# Patient Record
Sex: MALE | Race: OTHER | Employment: FULL TIME | URBAN - METROPOLITAN AREA
[De-identification: names, ages, dates, MRNs, and addresses within clinical notes are randomized per-mention and may not be internally consistent; named-entity substitution may affect disease eponyms.]

---

## 2017-10-05 ENCOUNTER — TELEPHONE (OUTPATIENT)
Dept: FAMILY MEDICINE CLINIC | Facility: CLINIC | Age: 47
End: 2017-10-05

## 2017-10-05 RX ORDER — CEFPODOXIME PROXETIL 200 MG/1
200 TABLET, FILM COATED ORAL 2 TIMES DAILY
Qty: 14 TABLET | Refills: 0 | Status: SHIPPED | OUTPATIENT
Start: 2017-10-05 | End: 2018-03-19

## 2018-02-01 RX ORDER — NAPROXEN 500 MG/1
TABLET ORAL
Qty: 60 TABLET | Refills: 1 | Status: SHIPPED | OUTPATIENT
Start: 2018-02-01 | End: 2018-07-24

## 2018-07-24 RX ORDER — NAPROXEN 500 MG/1
TABLET ORAL
Qty: 60 TABLET | Refills: 1 | OUTPATIENT
Start: 2018-07-24

## 2021-06-07 ENCOUNTER — OFFICE VISIT (OUTPATIENT)
Dept: INTERNAL MEDICINE CLINIC | Facility: CLINIC | Age: 51
End: 2021-06-07
Payer: COMMERCIAL

## 2021-06-07 VITALS
BODY MASS INDEX: 38.95 KG/M2 | RESPIRATION RATE: 17 BRPM | OXYGEN SATURATION: 99 % | TEMPERATURE: 98 F | WEIGHT: 257 LBS | HEART RATE: 75 BPM | DIASTOLIC BLOOD PRESSURE: 82 MMHG | HEIGHT: 68 IN | SYSTOLIC BLOOD PRESSURE: 136 MMHG

## 2021-06-07 DIAGNOSIS — R71.8 LOW MEAN CORPUSCULAR VOLUME (MCV): ICD-10-CM

## 2021-06-07 DIAGNOSIS — D50.9 IRON DEFICIENCY ANEMIA, UNSPECIFIED IRON DEFICIENCY ANEMIA TYPE: ICD-10-CM

## 2021-06-07 DIAGNOSIS — Z00.00 ENCOUNTER FOR PREVENTIVE CARE: ICD-10-CM

## 2021-06-07 DIAGNOSIS — R74.01 TRANSAMINITIS: ICD-10-CM

## 2021-06-07 DIAGNOSIS — R74.8 ELEVATED ALKALINE PHOSPHATASE LEVEL: ICD-10-CM

## 2021-06-07 DIAGNOSIS — M25.50 MULTIPLE JOINT PAIN: ICD-10-CM

## 2021-06-07 DIAGNOSIS — Z00.00 ANNUAL PHYSICAL EXAM: Primary | ICD-10-CM

## 2021-06-07 PROCEDURE — 3008F BODY MASS INDEX DOCD: CPT | Performed by: INTERNAL MEDICINE

## 2021-06-07 PROCEDURE — 99386 PREV VISIT NEW AGE 40-64: CPT | Performed by: INTERNAL MEDICINE

## 2021-06-07 PROCEDURE — 3079F DIAST BP 80-89 MM HG: CPT | Performed by: INTERNAL MEDICINE

## 2021-06-07 PROCEDURE — 36415 COLL VENOUS BLD VENIPUNCTURE: CPT | Performed by: INTERNAL MEDICINE

## 2021-06-07 PROCEDURE — 3075F SYST BP GE 130 - 139MM HG: CPT | Performed by: INTERNAL MEDICINE

## 2021-06-07 NOTE — PROGRESS NOTES
HPI/Subjective:     Patient ID: Saurabh Wagoner is a 46year old male.     HPI  Comes in today for first time to establish care for annual physical patient complains today of multijoint pain that has been going on for few months now on and off he plays basketba nursing note reviewed. Constitutional:       Appearance: He is well-developed. HENT:      Head: Normocephalic and atraumatic.       Right Ear: External ear normal.      Left Ear: External ear normal.      Nose: Nose normal.   Eyes:      Conjunctiva/scle

## 2021-06-09 ENCOUNTER — OFFICE VISIT (OUTPATIENT)
Dept: GASTROENTEROLOGY | Facility: CLINIC | Age: 51
End: 2021-06-09
Payer: COMMERCIAL

## 2021-06-09 ENCOUNTER — TELEPHONE (OUTPATIENT)
Dept: GASTROENTEROLOGY | Facility: CLINIC | Age: 51
End: 2021-06-09

## 2021-06-09 VITALS
TEMPERATURE: 99 F | DIASTOLIC BLOOD PRESSURE: 86 MMHG | WEIGHT: 256 LBS | HEART RATE: 83 BPM | HEIGHT: 68 IN | SYSTOLIC BLOOD PRESSURE: 133 MMHG | BODY MASS INDEX: 38.8 KG/M2

## 2021-06-09 DIAGNOSIS — R74.8 ELEVATED ALKALINE PHOSPHATASE LEVEL: ICD-10-CM

## 2021-06-09 DIAGNOSIS — D50.9 IRON DEFICIENCY ANEMIA, UNSPECIFIED IRON DEFICIENCY ANEMIA TYPE: Primary | ICD-10-CM

## 2021-06-09 DIAGNOSIS — R74.01 TRANSAMINITIS: ICD-10-CM

## 2021-06-09 PROCEDURE — 3008F BODY MASS INDEX DOCD: CPT | Performed by: NURSE PRACTITIONER

## 2021-06-09 PROCEDURE — 3075F SYST BP GE 130 - 139MM HG: CPT | Performed by: NURSE PRACTITIONER

## 2021-06-09 PROCEDURE — 99244 OFF/OP CNSLTJ NEW/EST MOD 40: CPT | Performed by: NURSE PRACTITIONER

## 2021-06-09 PROCEDURE — 3079F DIAST BP 80-89 MM HG: CPT | Performed by: NURSE PRACTITIONER

## 2021-06-09 NOTE — H&P
[facility], [facility] - Gastroenterology                                                                                                               Reason for consult: g Problem Relation Age of Onset   • Diabetes Brother    • No Known Problems Mother    • No Known Problems Father       Social History: Social History    Tobacco Use      Smoking status: Never Smoker      Smokeless tobacco: Never Used    Vaping Use      Vap Ref Range    WBC 7.0 4.0 - 11.0 x10(3) uL    RBC 6.02 (H) 4.30 - 5.70 x10(6)uL    HGB 13.9 13.0 - 17.5 g/dL    HCT 45.3 39.0 - 53.0 %    MCV 75.2 (L) 80.0 - 100.0 fL    MCH 23.1 (L) 26.0 - 34.0 pg    MCHC 30.7 (L) 31.0 - 37.0 g/dL    RDW-SD 46.5 (H) 35.1 - will also get celiac testing. #iron def  Not anemic on last cbc, however mcv low and iron panel c/w iron def. He denies overt signs/symptoms gi bleeding. He denies unintentional weight loss. He is w/o gi complaint. No fhx gi malignancy, ibd, celiac. during dictation, discrepancies may still exist.

## 2021-06-09 NOTE — TELEPHONE ENCOUNTER
Scheduled for:  Colonoscopy [id number] [id number]  Provider Name:  Dr. Gabe Costello  Date:  7/15/21  Location:  [address] [place] [address] 1  Sedation:  MAC  Time:  9:00am (pt is aware to arrive at 8:00am)  Prep:  Miralax/Gatorade  Meds/Allergies Reconciled?:  Ashley/FARHAN reviewed.    Matias White

## 2021-06-09 NOTE — PATIENT INSTRUCTIONS
-ultrasound with primary care  -avoid tylenol, alcohol  -low-fat diet    1. Schedule colonoscopy/egd with PAWAN manning/  [Diagnosis: iron def, ]    2.  bowel prep from pharmacy (split miralax/gatorade )    3.  Continue all medications prior to proce

## 2021-06-10 ENCOUNTER — TELEPHONE (OUTPATIENT)
Dept: INTERNAL MEDICINE CLINIC | Facility: CLINIC | Age: 51
End: 2021-06-10

## 2021-06-10 NOTE — TELEPHONE ENCOUNTER
Spoke with pt,  verified. Pt informed of lab result & MD recommendation, pt provided with central sched tel #. Pt stated understanding.               Holger Mondragon MD   2021  3:23 PM CDT       Iron levels also on the lower side follow with GI for

## 2021-06-16 ENCOUNTER — LAB ENCOUNTER (OUTPATIENT)
Dept: LAB | Facility: HOSPITAL | Age: 51
End: 2021-06-16
Attending: INTERNAL MEDICINE
Payer: COMMERCIAL

## 2021-06-16 ENCOUNTER — OFFICE VISIT (OUTPATIENT)
Dept: RHEUMATOLOGY | Facility: CLINIC | Age: 51
End: 2021-06-16
Payer: COMMERCIAL

## 2021-06-16 VITALS
DIASTOLIC BLOOD PRESSURE: 94 MMHG | BODY MASS INDEX: 38.8 KG/M2 | HEART RATE: 85 BPM | HEIGHT: 68 IN | WEIGHT: 256 LBS | SYSTOLIC BLOOD PRESSURE: 140 MMHG

## 2021-06-16 DIAGNOSIS — M06.9 RHEUMATOID ARTHRITIS INVOLVING MULTIPLE SITES, UNSPECIFIED WHETHER RHEUMATOID FACTOR PRESENT (HCC): Primary | ICD-10-CM

## 2021-06-16 DIAGNOSIS — M06.9 RHEUMATOID ARTHRITIS INVOLVING MULTIPLE SITES, UNSPECIFIED WHETHER RHEUMATOID FACTOR PRESENT (HCC): ICD-10-CM

## 2021-06-16 PROCEDURE — 86704 HEP B CORE ANTIBODY TOTAL: CPT | Performed by: INTERNAL MEDICINE

## 2021-06-16 PROCEDURE — 3077F SYST BP >= 140 MM HG: CPT | Performed by: INTERNAL MEDICINE

## 2021-06-16 PROCEDURE — 82955 ASSAY OF G6PD ENZYME: CPT | Performed by: INTERNAL MEDICINE

## 2021-06-16 PROCEDURE — 86803 HEPATITIS C AB TEST: CPT | Performed by: INTERNAL MEDICINE

## 2021-06-16 PROCEDURE — 86480 TB TEST CELL IMMUN MEASURE: CPT

## 2021-06-16 PROCEDURE — 36415 COLL VENOUS BLD VENIPUNCTURE: CPT

## 2021-06-16 PROCEDURE — 86431 RHEUMATOID FACTOR QUANT: CPT | Performed by: INTERNAL MEDICINE

## 2021-06-16 PROCEDURE — 86706 HEP B SURFACE ANTIBODY: CPT | Performed by: INTERNAL MEDICINE

## 2021-06-16 PROCEDURE — 3008F BODY MASS INDEX DOCD: CPT | Performed by: INTERNAL MEDICINE

## 2021-06-16 PROCEDURE — 99244 OFF/OP CNSLTJ NEW/EST MOD 40: CPT | Performed by: INTERNAL MEDICINE

## 2021-06-16 PROCEDURE — 87340 HEPATITIS B SURFACE AG IA: CPT | Performed by: INTERNAL MEDICINE

## 2021-06-16 PROCEDURE — 3080F DIAST BP >= 90 MM HG: CPT | Performed by: INTERNAL MEDICINE

## 2021-06-16 PROCEDURE — 86140 C-REACTIVE PROTEIN: CPT | Performed by: INTERNAL MEDICINE

## 2021-06-16 RX ORDER — METHYLPREDNISOLONE 4 MG/1
TABLET ORAL
Qty: 1 EACH | Refills: 0 | Status: SHIPPED | OUTPATIENT
Start: 2021-06-16

## 2021-06-16 RX ORDER — SULFASALAZINE 500 MG/1
TABLET ORAL
Qty: 120 TABLET | Refills: 1 | Status: SHIPPED | OUTPATIENT
Start: 2021-06-16 | End: 2021-07-28

## 2021-06-16 NOTE — PATIENT INSTRUCTIONS
You were seen today for a new diagnosis of rheumatoid arthritis that causes joint pain and swelling throughout your joints  Plan to start sulfasalazine 500 mg twice a day for 2 weeks then increase to 1000 mg twice a day  For now if your joints worsen with

## 2021-06-16 NOTE — PROGRESS NOTES
Carson Booth is a 46year old male who presents for Patient presents with:  Consult  Joint Pain  .    HPI:   CC: joint pain  Consult: referred by PCP Dr. Taylor Shoulder    This is a 47 yo M with hx of L ACT and meniscus repair (>15 year ago), Fe deficiency anemia (ne Never used    Alcohol use: Yes      Comment: occassionally    Drug use: No          REVIEW OF SYSTEMS:   Review Of Systems:  Constitutional: No fever, no change in weight or appetitie  Derm: No rashes, no oral ulcers, no alopecia, no photosensitivity, no p palpation. NEURO: Cranial nerves II-XII intact grossly. 5/5 strength throughout in both upper and lower extremities, sensation intact.   PSYCH: normal mood    LABS:     Component      Latest Ref Rng & Units 6/7/2021   URIC ACID      3.5 - 7.2 mg/dL 5.3   S

## 2021-07-05 RX ORDER — FERROUS SULFATE TAB EC 324 MG (65 MG FE EQUIVALENT) 324 (65 FE) MG
TABLET DELAYED RESPONSE ORAL
Qty: 30 TABLET | Refills: 0 | Status: SHIPPED | OUTPATIENT
Start: 2021-07-05 | End: 2021-07-29

## 2021-07-12 ENCOUNTER — LAB ENCOUNTER (OUTPATIENT)
Dept: LAB | Facility: HOSPITAL | Age: 51
End: 2021-07-12
Attending: INTERNAL MEDICINE
Payer: COMMERCIAL

## 2021-07-12 ENCOUNTER — TELEPHONE (OUTPATIENT)
Dept: GASTROENTEROLOGY | Facility: CLINIC | Age: 51
End: 2021-07-12

## 2021-07-12 DIAGNOSIS — R74.01 TRANSAMINITIS: ICD-10-CM

## 2021-07-12 DIAGNOSIS — R74.8 ELEVATED ALKALINE PHOSPHATASE LEVEL: ICD-10-CM

## 2021-07-12 DIAGNOSIS — E61.1 IRON DEFICIENCY: ICD-10-CM

## 2021-07-12 DIAGNOSIS — R79.89 ELEVATED LFTS: ICD-10-CM

## 2021-07-12 DIAGNOSIS — D50.9 IRON DEFICIENCY ANEMIA, UNSPECIFIED IRON DEFICIENCY ANEMIA TYPE: ICD-10-CM

## 2021-07-12 DIAGNOSIS — Z01.818 PRE-OP TESTING: ICD-10-CM

## 2021-07-12 LAB
DEPRECATED HBV CORE AB SER IA-ACNC: 215.7 NG/ML
HAV AB SER QL IA: REACTIVE
HAV IGM SER QL: NONREACTIVE
HBV CORE AB SERPL QL IA: NONREACTIVE
HBV SURFACE AB SER QL: NONREACTIVE
HBV SURFACE AB SERPL IA-ACNC: <3.1 MIU/ML
HBV SURFACE AG SERPL QL IA: NONREACTIVE
HCV AB SERPL QL IA: NONREACTIVE
IGA SERPL-MCNC: 583 MG/DL (ref 70–312)
IGM SERPL-MCNC: 324 MG/DL (ref 43–279)
IMMUNOGLOBULIN PNL SER-MCNC: 1610 MG/DL (ref 791–1643)
INR BLD: 1.16 (ref 0.9–1.2)
IRON SATURATION: 7 %
IRON SERPL-MCNC: 33 UG/DL
PROTHROMBIN TIME: 14.6 SECONDS (ref 11.8–14.5)
TOTAL IRON BINDING CAPACITY: 466 UG/DL (ref 240–450)
TRANSFERRIN SERPL-MCNC: 313 MG/DL (ref 200–360)
VIT B12 SERPL-MCNC: 262 PG/ML (ref 193–986)

## 2021-07-12 PROCEDURE — 82728 ASSAY OF FERRITIN: CPT

## 2021-07-12 PROCEDURE — 86708 HEPATITIS A ANTIBODY: CPT

## 2021-07-12 PROCEDURE — 87340 HEPATITIS B SURFACE AG IA: CPT

## 2021-07-12 PROCEDURE — 84466 ASSAY OF TRANSFERRIN: CPT

## 2021-07-12 PROCEDURE — 82607 VITAMIN B-12: CPT

## 2021-07-12 PROCEDURE — 86255 FLUORESCENT ANTIBODY SCREEN: CPT

## 2021-07-12 PROCEDURE — 36415 COLL VENOUS BLD VENIPUNCTURE: CPT

## 2021-07-12 PROCEDURE — 82390 ASSAY OF CERULOPLASMIN: CPT

## 2021-07-12 PROCEDURE — 86704 HEP B CORE ANTIBODY TOTAL: CPT

## 2021-07-12 PROCEDURE — 82784 ASSAY IGA/IGD/IGG/IGM EACH: CPT

## 2021-07-12 PROCEDURE — 83540 ASSAY OF IRON: CPT

## 2021-07-12 PROCEDURE — 86706 HEP B SURFACE ANTIBODY: CPT

## 2021-07-12 PROCEDURE — 85610 PROTHROMBIN TIME: CPT

## 2021-07-12 PROCEDURE — 86803 HEPATITIS C AB TEST: CPT

## 2021-07-12 PROCEDURE — 80500 HEPATITIS A B + C PROFILE: CPT

## 2021-07-12 PROCEDURE — 86709 HEPATITIS A IGM ANTIBODY: CPT

## 2021-07-12 PROCEDURE — 82103 ALPHA-1-ANTITRYPSIN TOTAL: CPT

## 2021-07-12 PROCEDURE — 86256 FLUORESCENT ANTIBODY TITER: CPT

## 2021-07-12 NOTE — TELEPHONE ENCOUNTER
Overdue reminder letter mailed out to patient.     Labs:     ACTIN (SMOOTH MUSCLE) ANTIBODY  ALPHA-1-ANTITRYPSIN, SERUM  CERULOPLASMIN  IMMUNOGLOBULIN A/G/M, QUANT  IMMUNOGLOBULIN A, QNANT  MITOCHONDRIAL (M2) ANTIBODY  PROTHROMBIN TIME (PT)  VITAMIN B12

## 2021-07-13 LAB
A1AT SERPL-MCNC: 221 MG/DL (ref 90–200)
CERULOPLASMIN SERPL-MCNC: 48.4 MG/DL (ref 20–60)
MITOCHONDRIA AB TITR SER: <20 {TITER}
SARS-COV-2 RNA RESP QL NAA+PROBE: NOT DETECTED
SMOOTH MUSCLE AB TITR SER: <20 {TITER}

## 2021-07-15 ENCOUNTER — ANESTHESIA (OUTPATIENT)
Dept: ENDOSCOPY | Age: 51
End: 2021-07-15
Payer: COMMERCIAL

## 2021-07-15 ENCOUNTER — HOSPITAL ENCOUNTER (OUTPATIENT)
Age: 51
Setting detail: HOSPITAL OUTPATIENT SURGERY
Discharge: HOME | End: 2021-07-15
Attending: INTERNAL MEDICINE | Admitting: INTERNAL MEDICINE
Payer: COMMERCIAL

## 2021-07-15 ENCOUNTER — ANESTHESIA EVENT (OUTPATIENT)
Dept: ENDOSCOPY | Age: 51
End: 2021-07-15
Payer: COMMERCIAL

## 2021-07-15 VITALS
BODY MASS INDEX: 37.13 KG/M2 | DIASTOLIC BLOOD PRESSURE: 86 MMHG | SYSTOLIC BLOOD PRESSURE: 128 MMHG | OXYGEN SATURATION: 97 % | WEIGHT: 245 LBS | RESPIRATION RATE: 20 BRPM | TEMPERATURE: 98 F | HEIGHT: 68 IN | HEART RATE: 86 BPM

## 2021-07-15 DIAGNOSIS — D50.9 IRON DEFICIENCY ANEMIA, UNSPECIFIED IRON DEFICIENCY ANEMIA TYPE: ICD-10-CM

## 2021-07-15 DIAGNOSIS — Z01.818 PRE-OP TESTING: Primary | ICD-10-CM

## 2021-07-15 PROCEDURE — 45380 COLONOSCOPY AND BIOPSY: CPT | Performed by: INTERNAL MEDICINE

## 2021-07-15 PROCEDURE — 43239 EGD BIOPSY SINGLE/MULTIPLE: CPT | Performed by: INTERNAL MEDICINE

## 2021-07-15 PROCEDURE — 99070 SPECIAL SUPPLIES PHYS/QHP: CPT | Performed by: INTERNAL MEDICINE

## 2021-07-15 RX ORDER — MIDAZOLAM HYDROCHLORIDE 1 MG/ML
INJECTION INTRAMUSCULAR; INTRAVENOUS AS NEEDED
Status: DISCONTINUED | OUTPATIENT
Start: 2021-07-15 | End: 2021-07-15 | Stop reason: SURG

## 2021-07-15 RX ORDER — SODIUM CHLORIDE, SODIUM LACTATE, POTASSIUM CHLORIDE, CALCIUM CHLORIDE 600; 310; 30; 20 MG/100ML; MG/100ML; MG/100ML; MG/100ML
INJECTION, SOLUTION INTRAVENOUS CONTINUOUS
Status: DISCONTINUED | OUTPATIENT
Start: 2021-07-15 | End: 2021-07-15

## 2021-07-15 RX ADMIN — MIDAZOLAM HYDROCHLORIDE 2 MG: 1 INJECTION INTRAMUSCULAR; INTRAVENOUS at 09:01:00

## 2021-07-15 RX ADMIN — SODIUM CHLORIDE, SODIUM LACTATE, POTASSIUM CHLORIDE, CALCIUM CHLORIDE: 600; 310; 30; 20 INJECTION, SOLUTION INTRAVENOUS at 08:49:00

## 2021-07-15 NOTE — OPERATIVE REPORT
ESOPHAGOGASTRODUODENOSCOPY (EGD) & COLONOSCOPY REPORT    Veto Rosario    JINA 1970 Age 46year old   PCP Honorio Lind MD Endoscopist Jhon Arguello MD     Date of procedure: 07/15/21    Procedure: EGD w/biopsies & Colonoscopy w/cold biopsy polypectomy    P patient who tolerated the procedure well. Complications: None    EGD findings:      1. Esophagus: The squamocolumnar junction was noted at 40 cm and appeared irregular. The GE junction was noted at 40 cm from the incisors. No significant hiatal hernia. gastric biopsies, gastric polyp, ascending polyps and transverse polyp

## 2021-07-15 NOTE — H&P
Pre Procedure History & Physical Examination    Patient Name: Sabiha Morales  MRN: [id number]  CSN: [id number]  YOB: 1970    Diagnosis: low iron     FERROUS SULFATE 324 (65 Fe) MG Oral Tab EC, TAKE 1 TABLET BY MOUTH EVERY DAY, Disp: 30 tablet, Rf SpO2 97%   BMI 37.25 kg/m²       Gen: Patient appears comfortable and in no acute discomfort  HEENT: the sclera appears anicteric, oropharynx clear, mucus membranes appear moist  CV: regular rate and rhythm, the extremities are warm and well perfused   L

## 2021-07-15 NOTE — ANESTHESIA PREPROCEDURE EVALUATION
Anesthesia PreOp Note    HPI:     Jim Delgado is a 46year old male who presents for preoperative consultation requested by: Sadu Chan MD    Date of Surgery: 7/15/2021    Procedure(s):  COLONOSCOPY/ESOPHAGOGASTRODUODENOSCOPY  ESOPHAGOGASTRODUODENOSCOPY Never used    Substance and Sexual Activity      Alcohol use: Not Currently        Comment: occassionally      Drug use: No      Sexual activity: Not on file    Other Topics      Concerns:        Caffeine Concern: Not Asked        Exercise: Not Asked is 1.727 m (5' 8\") and weight is 111.1 kg (245 lb). His blood pressure is 138/95 (abnormal) and his pulse is 99. His respiration is 15 and oxygen saturation is 98%.     07/15/21  0841   BP: (!) 138/95   Pulse: 99   Resp: 15   SpO2: 98%   Weight: 111.1 kg (

## 2021-07-15 NOTE — ANESTHESIA POSTPROCEDURE EVALUATION
Patient: Renny Sánchez    Procedure Summary     Date: 07/15/21 Room / Location: [facility] ENDOSCOPY 01 / [facility] ENDO    Anesthesia Start: 0515 Anesthesia Stop:     Procedures:       COLONOSCOPY/ESOPHAGOGASTRODUODENOSCOPY (N/A )      ESOPHAGOGASTRODUODENOSCOPY (N/

## 2021-07-17 ENCOUNTER — HOSPITAL ENCOUNTER (OUTPATIENT)
Dept: ULTRASOUND IMAGING | Facility: HOSPITAL | Age: 51
Discharge: HOME | End: 2021-07-17
Attending: INTERNAL MEDICINE
Payer: COMMERCIAL

## 2021-07-17 DIAGNOSIS — R79.89 ELEVATED LFTS: ICD-10-CM

## 2021-07-17 PROCEDURE — 76705 ECHO EXAM OF ABDOMEN: CPT | Performed by: INTERNAL MEDICINE

## 2021-07-28 ENCOUNTER — TELEPHONE (OUTPATIENT)
Dept: GASTROENTEROLOGY | Facility: CLINIC | Age: 51
End: 2021-07-28

## 2021-07-28 ENCOUNTER — OFFICE VISIT (OUTPATIENT)
Dept: RHEUMATOLOGY | Facility: CLINIC | Age: 51
End: 2021-07-28
Payer: COMMERCIAL

## 2021-07-28 ENCOUNTER — TELEPHONE (OUTPATIENT)
Dept: OPHTHALMOLOGY | Facility: CLINIC | Age: 51
End: 2021-07-28

## 2021-07-28 ENCOUNTER — TELEPHONE (OUTPATIENT)
Dept: INTERNAL MEDICINE CLINIC | Facility: CLINIC | Age: 51
End: 2021-07-28

## 2021-07-28 VITALS
HEIGHT: 68 IN | WEIGHT: 245 LBS | BODY MASS INDEX: 37.13 KG/M2 | SYSTOLIC BLOOD PRESSURE: 119 MMHG | HEART RATE: 96 BPM | DIASTOLIC BLOOD PRESSURE: 89 MMHG

## 2021-07-28 DIAGNOSIS — H57.12 PAIN OF LEFT EYE: Primary | ICD-10-CM

## 2021-07-28 DIAGNOSIS — M06.9 RHEUMATOID ARTHRITIS INVOLVING MULTIPLE SITES, UNSPECIFIED WHETHER RHEUMATOID FACTOR PRESENT (HCC): ICD-10-CM

## 2021-07-28 DIAGNOSIS — R79.89 ELEVATED LFTS: Primary | ICD-10-CM

## 2021-07-28 PROCEDURE — 3079F DIAST BP 80-89 MM HG: CPT | Performed by: INTERNAL MEDICINE

## 2021-07-28 PROCEDURE — 3008F BODY MASS INDEX DOCD: CPT | Performed by: INTERNAL MEDICINE

## 2021-07-28 PROCEDURE — 99214 OFFICE O/P EST MOD 30 MIN: CPT | Performed by: INTERNAL MEDICINE

## 2021-07-28 PROCEDURE — 3074F SYST BP LT 130 MM HG: CPT | Performed by: INTERNAL MEDICINE

## 2021-07-28 RX ORDER — SULFASALAZINE 500 MG/1
1500 TABLET ORAL 2 TIMES DAILY
Qty: 180 TABLET | Refills: 1 | Status: SHIPPED | OUTPATIENT
Start: 2021-07-28 | End: 2021-09-29

## 2021-07-28 RX ORDER — METHYLPREDNISOLONE 4 MG/1
TABLET ORAL
Qty: 1 EACH | Refills: 0 | Status: SHIPPED | OUTPATIENT
Start: 2021-07-28

## 2021-07-28 NOTE — TELEPHONE ENCOUNTER
Dr. Yun Irwin-    Patient underwent colonoscopy/egd 07/15/2021. Completed us liver placed by pcp 07/17/2021 which showed fatty liver and to follow up with gastro. Patient wants to added to MD's schedule as soon as possible to discuss next steps.      No

## 2021-07-28 NOTE — TELEPHONE ENCOUNTER
If he has iron studies done in July 12, you do not need to repeat it now.   Please inform him thank you

## 2021-07-28 NOTE — TELEPHONE ENCOUNTER
Tad Kinsey did the chronic liver disease workup. There is no urgency to follow up for likely fatty liver disease. Is he having more acute issues? No cause for COLIN found, can discuss capsule. Can follow up with me or lalita next available.  Does lalita

## 2021-07-28 NOTE — TELEPHONE ENCOUNTER
Nursing:  Liver w/u remarkable for elevated IgA-c/w BOWSER  Elevated IgM-M2 unremarkable    XANDER positive with PCP and titer 320  ASMA normal  Alpha 1 elevated    No acute viral hep-  Hep A reactive-IgM nonreactive  Not immune to Hep B and recommend vaccine w

## 2021-07-28 NOTE — PATIENT INSTRUCTIONS
You were seen today for rheumatoid arthritis  Plan to increase sulfasalazine to 3 pills twice a day  I also gave you the Medrol Dosepak in case her joints worsen  If your symptoms do not improve in 6 weeks would then recommend to add Plaquenil which is the

## 2021-07-28 NOTE — TELEPHONE ENCOUNTER
Patient is requesting orders to check his iron levels. Patient is almost out of medication and he needs to know if he should continue taking medication.  Patient will be at [facility] at 11:40 and is requesting orders before then to make sure he can complete

## 2021-07-28 NOTE — PROGRESS NOTES
Raquel Sapp is a 46year old male. HPI:   No chief complaint on file. I had the pleasure of seeing Raquel Sapp on 7/28/2021 for follow up Seropositive RA (+RF and CCP).      Current Medications:  SSZ 1000 mg BID- started June 2021  Blood work:  ESR some pain and swelling in the left wrist  He also developed left eye redness and pain that started yesterday    HISTORY:  Past Medical History:   Diagnosis Date   • PONV (postoperative nausea and vomiting)     nausea      Social Hx Reviewed   Family Hx Rev Quantiferon-TB1 Minus NIL      IU/mL 0.06    Quantiferon-TB2 Minus NIL      IU/mL 0.01    Quantiferon TB Mitogen minus NIL      IU/mL >10.00    QTB. RESULT      Negative Negative    XANDER Titer/Pattern      <80  320 (A)   Reviewed By:        Nithya Conley,

## 2021-07-28 NOTE — TELEPHONE ENCOUNTER
Left message to call back. Will await call back to review results/orders placed per aprn message below. Need to assist with setting up office visit follow up with Kellie Heading next available.

## 2021-07-29 RX ORDER — FERROUS SULFATE TAB EC 324 MG (65 MG FE EQUIVALENT) 324 (65 FE) MG
1 TABLET DELAYED RESPONSE ORAL DAILY
Qty: 30 TABLET | Refills: 0 | Status: SHIPPED | OUTPATIENT
Start: 2021-07-29

## 2021-07-29 NOTE — TELEPHONE ENCOUNTER
Spoke to patient and informed him that he will need to repeat his labs mid August based on results notes from 7/13/21    Quinton Crandall   7/13/2021 12:46 PM CDT Back to Top      Pt informed to take iron bid and will recheck level in one month.      He is out o

## 2021-07-29 NOTE — TELEPHONE ENCOUNTER
Patient complains of intermittent pain and redness OS>OD which comes and goes for the last several months. Appointment scheduled for an office visit on 08/04/21 with Dr. Danis Lanza.

## 2021-07-29 NOTE — TELEPHONE ENCOUNTER
Alok Littlejohn., MD  You 27 minutes ago (12:16 PM)     Please send this to the correct doctor.  Dr. Salma Banegas is covering for Dr. Selvin Franklin was the patient's    Message text

## 2021-07-30 RX ORDER — FERROUS SULFATE 325(65) MG
325 TABLET ORAL 2 TIMES DAILY
Qty: 60 TABLET | Refills: 0 | Status: SHIPPED | OUTPATIENT
Start: 2021-07-30 | End: 2021-08-25

## 2021-07-30 RX ORDER — FERROUS SULFATE TAB EC 324 MG (65 MG FE EQUIVALENT) 324 (65 FE) MG
TABLET DELAYED RESPONSE ORAL
Qty: 30 TABLET | Refills: 0 | OUTPATIENT
Start: 2021-07-30

## 2021-07-30 NOTE — TELEPHONE ENCOUNTER
Patient called and stated that he is completely out of Iron pills. The patient stated that on 7/13 were instructed to take \"2\" pills (increased) after blood work. He is out now which is the end of July.   He can't take blood work until mid August.

## 2021-07-30 NOTE — TELEPHONE ENCOUNTER
Per notes below/  Rx sent in for BID. Result Care Coordination    Result Notes   Evelina Mcginnis   7/13/2021 12:46 PM CDT Back to Top      Pt informed to take iron bid and will recheck level in one month.     Evelina Mcginnis   7/13/2021 10:32 AM CDT       Pt inf

## 2021-07-30 NOTE — TELEPHONE ENCOUNTER
rob the pharmacist was notified of script sent on 7/29. RN advised Rob to check their system, why did we get an electronic request for this medication?

## 2021-08-03 NOTE — TELEPHONE ENCOUNTER
Followed up with Emir Worthy to review aprn message below, pertaining to active orders and recommendations. Patient persistent in being seen in clinic as soon as possible to discuss next steps.      Offered res 24 for tomorrow, 08/04/2021 with Estil Height at 1:3

## 2021-08-04 ENCOUNTER — OFFICE VISIT (OUTPATIENT)
Dept: GASTROENTEROLOGY | Facility: CLINIC | Age: 51
End: 2021-08-04
Payer: COMMERCIAL

## 2021-08-04 ENCOUNTER — TELEPHONE (OUTPATIENT)
Dept: GASTROENTEROLOGY | Facility: CLINIC | Age: 51
End: 2021-08-04

## 2021-08-04 ENCOUNTER — OFFICE VISIT (OUTPATIENT)
Dept: OPHTHALMOLOGY | Facility: CLINIC | Age: 51
End: 2021-08-04
Payer: COMMERCIAL

## 2021-08-04 VITALS
WEIGHT: 235 LBS | BODY MASS INDEX: 35.61 KG/M2 | HEART RATE: 93 BPM | HEIGHT: 68 IN | DIASTOLIC BLOOD PRESSURE: 85 MMHG | SYSTOLIC BLOOD PRESSURE: 124 MMHG

## 2021-08-04 DIAGNOSIS — K76.0 HEPATIC STEATOSIS: ICD-10-CM

## 2021-08-04 DIAGNOSIS — R79.89 ELEVATED LFTS: ICD-10-CM

## 2021-08-04 DIAGNOSIS — D50.9 IRON DEFICIENCY ANEMIA, UNSPECIFIED IRON DEFICIENCY ANEMIA TYPE: Primary | ICD-10-CM

## 2021-08-04 DIAGNOSIS — H57.89 IRRITATION OF LEFT EYE: Primary | ICD-10-CM

## 2021-08-04 DIAGNOSIS — Z86.010 PERSONAL HISTORY OF COLONIC POLYPS: ICD-10-CM

## 2021-08-04 DIAGNOSIS — H57.89 ITCH OF LEFT EYE: ICD-10-CM

## 2021-08-04 PROBLEM — H57.9 ITCH OF LEFT EYE: Status: ACTIVE | Noted: 2021-08-04

## 2021-08-04 PROCEDURE — 3008F BODY MASS INDEX DOCD: CPT | Performed by: NURSE PRACTITIONER

## 2021-08-04 PROCEDURE — 99215 OFFICE O/P EST HI 40 MIN: CPT | Performed by: NURSE PRACTITIONER

## 2021-08-04 PROCEDURE — 3079F DIAST BP 80-89 MM HG: CPT | Performed by: NURSE PRACTITIONER

## 2021-08-04 PROCEDURE — 99242 OFF/OP CONSLTJ NEW/EST SF 20: CPT | Performed by: OPHTHALMOLOGY

## 2021-08-04 PROCEDURE — 3074F SYST BP LT 130 MM HG: CPT | Performed by: NURSE PRACTITIONER

## 2021-08-04 RX ORDER — POLYETHYLENE GLYCOL 3350, SODIUM CHLORIDE, SODIUM BICARBONATE, POTASSIUM CHLORIDE 420; 11.2; 5.72; 1.48 G/4L; G/4L; G/4L; G/4L
POWDER, FOR SOLUTION ORAL
Qty: 2000 ML | Refills: 0 | Status: SHIPPED | OUTPATIENT
Start: 2021-08-04 | End: 2021-11-22 | Stop reason: ALTCHOICE

## 2021-08-04 NOTE — PROGRESS NOTES
Delores Liz is a 46year old male. HPI:     HPI     Consult      Additional comments: Per Dr. Marlene Thomas was recently Dx with Rheumatoid Arthritis in July 2021 and has been following up with Dr. Fly Blandon every 6 weeks.  Pt states that Therapy Pack Take as directed on package. 1 each 0   • methylPREDNISolone 4 MG Oral Tablet Therapy Pack Take as directed on package.  1 each 0       Allergies:    Seasonal                OTHER (SEE COMMENTS)    Comment:Runny Nose, watery eyes    ROS:     RO Patient was also told to take pictures with his cell phone if he is in the active phase. Discussed with patient symptoms of iritis. Told patient to watch for symptoms of pain, redness, light sensitivity and blurry vision.     For itching, patient should

## 2021-08-04 NOTE — ASSESSMENT & PLAN NOTE
Discussed with patient that there is no active problem in the left eye today. Discussed that many times patient with Rheumatoid Arthritis can have episodes of iritis.   Told patient this could be intermittent bouts of iritis, but on further discussion, pa

## 2021-08-04 NOTE — PROGRESS NOTES
[facility] - Gastroenterology                                                                                                               Reason for consult: f/u    Requesting physician or provider: Vishnu Allen mouth, no Raynaud phenomenon    NSAIDS:  stopped naproxen  Tobacco: no  Alcohol: no  Illicit drugs: no  Stopped tylenol  Takes turmeric and lavern for inflammation     No FH GI malignancy, ibd, celiac     No history of adverse reaction to sedation  No IGGY tablets (1,500 mg total) by mouth 2 (two) times daily. 180 tablet 1   • methylPREDNISolone 4 MG Oral Tablet Therapy Pack Take as directed on package. 1 each 0   • methylPREDNISolone 4 MG Oral Tablet Therapy Pack Take as directed on package.  1 each 0 esophagitis, stricture or endoscopic evidence of Hawkins's esophagus. 2. Stomach: The stomach distended normally. Normal rugal folds were seen. The pylorus was patent. The gastric mucosa appeared erythematous so biopsies taken for H pylori.  There was a 4 adenoma.     D. Transverse colon polyp:  · Polypoid fragment of colonic mucosa with no significant pathologic changes.        .  ASSESSMENT/PLAN:   Derian Canada is a 46year old year-old male with history of htn:    #COLIN  S/p cln/egd w/ Dr. Gabe Costello w/o obvious Referrals:  None    ENDOSCOPIC RISK BENEFIT DISCUSSION: I described the procedure in great detail with the patient.  I discussed the risks and benefits, including but not limited to: bleeding, perforation, infection, anesthesia complications, and even death

## 2021-08-04 NOTE — TELEPHONE ENCOUNTER
Scheduled for:  E [id number]  Provider Name:  Dr. Sima Tran  Date:  08/12/2021 ([place]/brian)  Location:  [facility]  Sedation:  No sedation   Time:  7:30am, (pt is aware to arrive at 6:45am)    Prep:  Single does trilyte  Meds/Allergies Reconciled?:  Ashley/FARHAN re

## 2021-08-04 NOTE — PATIENT INSTRUCTIONS
Itch of left eye  Discussed with patient that there is no active problem in the left eye today. Discussed that many times patient with Rheumatoid Arthritis can have episodes of iritis.   Told patient this could be intermittent bouts of iritis, but on furt

## 2021-08-04 NOTE — PATIENT INSTRUCTIONS
-repeat colon in 5 years (7/2026)  -video capsule endoscopy w/ Dr. Andrei Sequeira  1/2 bowel prep  Dx: iron def anemia  -labs  -avoid nsaids, acetaminophen, alcohol  -hep b vaccine with PCP  -er if yellowing of your eyes/jaundice, abd pain, drowsiness, change in le

## 2021-08-04 NOTE — TELEPHONE ENCOUNTER
Entered into Epic. Recall CLN in 5 years per owen Durbin. Last CLN done 07/15/2021 with Dr. Sagrario Rucker. Recall entered into Patient Outreach for 07/15/2026. HM updated.

## 2021-08-06 ENCOUNTER — TELEPHONE (OUTPATIENT)
Dept: GASTROENTEROLOGY | Facility: CLINIC | Age: 51
End: 2021-08-06

## 2021-08-06 NOTE — TELEPHONE ENCOUNTER
Current Outpatient Medications   Medication Sig Dispense Refill   • PEG 3350-KCl-Na Bicarb-NaCl (TRILYTE) 420 g Oral Recon Soln Take prep as directed by gastro office. May substitute with Trilyte/generic equivalent if needed.  2000 mL 0       Not available-

## 2021-08-06 NOTE — TELEPHONE ENCOUNTER
Contacted Todd López to confirm alternative pharmacy close to his location for prep prescription. Preferred pharmacy ([facility]) out of stock. Requested orders be sent to the [facility] in [place].  P: [id number]    Placed verbal order with Pal Simon (Desirae Acton

## 2021-08-07 NOTE — PAT NURSING NOTE
Pt took iron yesterday and is aware to start holding today 08/07/21.  is aware and  said it is okay to proceed with capsule.

## 2021-08-10 ENCOUNTER — LAB ENCOUNTER (OUTPATIENT)
Dept: LAB | Facility: HOSPITAL | Age: 51
End: 2021-08-10
Attending: INTERNAL MEDICINE
Payer: COMMERCIAL

## 2021-08-10 DIAGNOSIS — Z01.818 PRE-OP TESTING: ICD-10-CM

## 2021-08-11 LAB — SARS-COV-2 RNA RESP QL NAA+PROBE: NOT DETECTED

## 2021-08-12 ENCOUNTER — HOSPITAL ENCOUNTER (OUTPATIENT)
Facility: HOSPITAL | Age: 51
Setting detail: HOSPITAL OUTPATIENT SURGERY
Discharge: HOME | End: 2021-08-12
Attending: INTERNAL MEDICINE | Admitting: INTERNAL MEDICINE
Payer: COMMERCIAL

## 2021-08-12 VITALS
BODY MASS INDEX: 34.86 KG/M2 | RESPIRATION RATE: 11 BRPM | WEIGHT: 230 LBS | DIASTOLIC BLOOD PRESSURE: 98 MMHG | TEMPERATURE: 99 F | HEIGHT: 68 IN | SYSTOLIC BLOOD PRESSURE: 129 MMHG | HEART RATE: 94 BPM | OXYGEN SATURATION: 97 %

## 2021-08-12 DIAGNOSIS — D50.9 IRON DEFICIENCY ANEMIA, UNSPECIFIED IRON DEFICIENCY ANEMIA TYPE: ICD-10-CM

## 2021-08-12 DIAGNOSIS — Z01.818 PRE-OP TESTING: Primary | ICD-10-CM

## 2021-08-12 PROCEDURE — 0DJ07ZZ INSPECTION OF UPPER INTESTINAL TRACT, VIA NATURAL OR ARTIFICIAL OPENING: ICD-10-PCS | Performed by: INTERNAL MEDICINE

## 2021-08-12 PROCEDURE — 91110 GI TRC IMG INTRAL ESOPH-ILE: CPT | Performed by: INTERNAL MEDICINE

## 2021-08-25 NOTE — TELEPHONE ENCOUNTER
Please review. Protocol failed or has no protocol.   Requested Prescriptions   Pending Prescriptions Disp Refills    FERROUS SULFATE 325 (65 Fe) MG Oral Tab [Pharmacy Med Name: FERROUS SULFATE 325 MG TABLET] 60 tablet 0     Sig: TAKE 1 TABLET BY MOUTH TWICE

## 2021-08-26 RX ORDER — FERROUS SULFATE 325(65) MG
1 TABLET ORAL 2 TIMES DAILY
Qty: 180 TABLET | Refills: 1 | Status: SHIPPED | OUTPATIENT
Start: 2021-08-26

## 2021-08-27 ENCOUNTER — TELEPHONE (OUTPATIENT)
Dept: GASTROENTEROLOGY | Facility: CLINIC | Age: 51
End: 2021-08-27

## 2021-08-27 NOTE — TELEPHONE ENCOUNTER
Overdue reminder letter mailed out to patient.     Labs:   CBC WITH DIFFERENTIAL WITH PLATELET   COMP METABOLIC PANEL (14)   PROTHROMBIN TIME (PT)

## 2021-09-08 ENCOUNTER — LAB ENCOUNTER (OUTPATIENT)
Dept: LAB | Facility: HOSPITAL | Age: 51
End: 2021-09-08
Attending: NURSE PRACTITIONER
Payer: COMMERCIAL

## 2021-09-08 ENCOUNTER — OFFICE VISIT (OUTPATIENT)
Dept: RHEUMATOLOGY | Facility: CLINIC | Age: 51
End: 2021-09-08
Payer: COMMERCIAL

## 2021-09-08 ENCOUNTER — TELEPHONE (OUTPATIENT)
Dept: GASTROENTEROLOGY | Facility: CLINIC | Age: 51
End: 2021-09-08

## 2021-09-08 VITALS
WEIGHT: 236 LBS | BODY MASS INDEX: 35.77 KG/M2 | HEIGHT: 68 IN | DIASTOLIC BLOOD PRESSURE: 97 MMHG | HEART RATE: 77 BPM | SYSTOLIC BLOOD PRESSURE: 144 MMHG

## 2021-09-08 DIAGNOSIS — D50.9 IRON DEFICIENCY ANEMIA, UNSPECIFIED IRON DEFICIENCY ANEMIA TYPE: ICD-10-CM

## 2021-09-08 DIAGNOSIS — R74.8 ALKALINE PHOSPHATASE ELEVATION: ICD-10-CM

## 2021-09-08 DIAGNOSIS — M06.9 RHEUMATOID ARTHRITIS INVOLVING MULTIPLE SITES, UNSPECIFIED WHETHER RHEUMATOID FACTOR PRESENT (HCC): Primary | ICD-10-CM

## 2021-09-08 DIAGNOSIS — Z51.81 MEDICATION MONITORING ENCOUNTER: ICD-10-CM

## 2021-09-08 DIAGNOSIS — K76.0 HEPATIC STEATOSIS: ICD-10-CM

## 2021-09-08 DIAGNOSIS — R79.89 ELEVATED LFTS: ICD-10-CM

## 2021-09-08 DIAGNOSIS — R76.8 HIGH TOTAL SERUM IGM: ICD-10-CM

## 2021-09-08 DIAGNOSIS — R74.01 TRANSAMINITIS: Primary | ICD-10-CM

## 2021-09-08 LAB
ALBUMIN SERPL-MCNC: 3.6 G/DL (ref 3.4–5)
ALBUMIN/GLOB SERPL: 1 {RATIO} (ref 1–2)
ALP LIVER SERPL-CCNC: 172 U/L
ALT SERPL-CCNC: 127 U/L
ANION GAP SERPL CALC-SCNC: 6 MMOL/L (ref 0–18)
AST SERPL-CCNC: 111 U/L (ref 15–37)
BASOPHILS # BLD AUTO: 0.02 X10(3) UL (ref 0–0.2)
BASOPHILS NFR BLD AUTO: 0.3 %
BILIRUB SERPL-MCNC: 0.5 MG/DL (ref 0.1–2)
BUN BLD-MCNC: 13 MG/DL (ref 7–18)
BUN/CREAT SERPL: 18.6 (ref 10–20)
CALCIUM BLD-MCNC: 8.7 MG/DL (ref 8.5–10.1)
CHLORIDE SERPL-SCNC: 107 MMOL/L (ref 98–112)
CO2 SERPL-SCNC: 26 MMOL/L (ref 21–32)
CREAT BLD-MCNC: 0.7 MG/DL
DEPRECATED RDW RBC AUTO: 49.4 FL (ref 35.1–46.3)
EOSINOPHIL # BLD AUTO: 0.58 X10(3) UL (ref 0–0.7)
EOSINOPHIL NFR BLD AUTO: 9.1 %
ERYTHROCYTE [DISTWIDTH] IN BLOOD BY AUTOMATED COUNT: 19 % (ref 11–15)
GLOBULIN PLAS-MCNC: 3.5 G/DL (ref 2.8–4.4)
GLUCOSE BLD-MCNC: 101 MG/DL (ref 70–99)
HCT VFR BLD AUTO: 47.3 %
HGB BLD-MCNC: 15.1 G/DL
IMM GRANULOCYTES # BLD AUTO: 0.02 X10(3) UL (ref 0–1)
IMM GRANULOCYTES NFR BLD: 0.3 %
INR BLD: 1.08 (ref 0.9–1.2)
LYMPHOCYTES # BLD AUTO: 2.56 X10(3) UL (ref 1–4)
LYMPHOCYTES NFR BLD AUTO: 40 %
M PROTEIN MFR SERPL ELPH: 7.1 G/DL (ref 6.4–8.2)
MCH RBC QN AUTO: 24.7 PG (ref 26–34)
MCHC RBC AUTO-ENTMCNC: 31.9 G/DL (ref 31–37)
MCV RBC AUTO: 77.3 FL
MONOCYTES # BLD AUTO: 0.64 X10(3) UL (ref 0.1–1)
MONOCYTES NFR BLD AUTO: 10 %
NEUTROPHILS # BLD AUTO: 2.58 X10 (3) UL (ref 1.5–7.7)
NEUTROPHILS # BLD AUTO: 2.58 X10(3) UL (ref 1.5–7.7)
NEUTROPHILS NFR BLD AUTO: 40.3 %
OSMOLALITY SERPL CALC.SUM OF ELEC: 288 MOSM/KG (ref 275–295)
PATIENT FASTING Y/N/NP: YES
PLATELET # BLD AUTO: 191 10(3)UL (ref 150–450)
POTASSIUM SERPL-SCNC: 4.1 MMOL/L (ref 3.5–5.1)
PROTHROMBIN TIME: 13.8 SECONDS (ref 11.8–14.5)
RBC # BLD AUTO: 6.12 X10(6)UL
SODIUM SERPL-SCNC: 139 MMOL/L (ref 136–145)
WBC # BLD AUTO: 6.4 X10(3) UL (ref 4–11)

## 2021-09-08 PROCEDURE — 36415 COLL VENOUS BLD VENIPUNCTURE: CPT

## 2021-09-08 PROCEDURE — 85610 PROTHROMBIN TIME: CPT

## 2021-09-08 PROCEDURE — 84466 ASSAY OF TRANSFERRIN: CPT

## 2021-09-08 PROCEDURE — 80053 COMPREHEN METABOLIC PANEL: CPT

## 2021-09-08 PROCEDURE — 3008F BODY MASS INDEX DOCD: CPT | Performed by: INTERNAL MEDICINE

## 2021-09-08 PROCEDURE — 82728 ASSAY OF FERRITIN: CPT

## 2021-09-08 PROCEDURE — 99214 OFFICE O/P EST MOD 30 MIN: CPT | Performed by: INTERNAL MEDICINE

## 2021-09-08 PROCEDURE — 83540 ASSAY OF IRON: CPT

## 2021-09-08 PROCEDURE — 85025 COMPLETE CBC W/AUTO DIFF WBC: CPT

## 2021-09-08 PROCEDURE — 3080F DIAST BP >= 90 MM HG: CPT | Performed by: INTERNAL MEDICINE

## 2021-09-08 PROCEDURE — 3077F SYST BP >= 140 MM HG: CPT | Performed by: INTERNAL MEDICINE

## 2021-09-08 NOTE — PATIENT INSTRUCTIONS
You were seen today for rheumatoid arthritis, symptoms are better controlled  Plan to continue sulfasalazine at 1000 mg twice a day  Follow-up in 2 months, hopefully joints will continue to to do well

## 2021-09-08 NOTE — PROGRESS NOTES
Wyatt Salazar is a 46year old male. HPI:   Patient presents with:  Medication Follow-Up  Test Results      I had the pleasure of seeing Wyatt Salazar on 9/8/2021 for follow up Seropositive RA (+RF and CCP).      Current Medications:  SSZ 1000 mg BID- starte specialist at the [address] [place] [address] and they noted he had osteoarthritis and was recommending gel injections  Tolerating sulfasalazine, never went up to 1500 mg twice a day          HISTORY:  Past Medical History:   Diagnosis Date   • PONV (postoperative terry present. No pain with ROM. Ankles: FROM, no pain or swelling or warmth on palpation  Feet: no pain with MTP squeeze, no toe swelling or pain or warmth on palpation with FROM  Spine: no lumbar or sacral pain on palpation.   NEURO: Cranial nerves II-XII int evidence of uveitis or iritis    Transaminitis secondary to hepatic steatosis  - Following with GI    Pt will f/u in 2 mos    Sydni Dunn MD  9/8/2021   10:00 AM

## 2021-09-08 NOTE — TELEPHONE ENCOUNTER
Jazmine Krause. Verified . Reviewed aprn message below. Provided central scheduling number to set up mrcp as to be arranged at his earliest convenience. Relayed Dr. Rodriguez Court number to facilitate consult with hepatology per recommendations.  A

## 2021-09-08 NOTE — TELEPHONE ENCOUNTER
APN contacted pt regarding lab testing. AST/ALT/ALK phos up from comparison. PLT normal, however down from comparison.   PT/INR normal.    Liver ultrasound ordered by PCP 7/2021 c/w fatty liver    Liver w/u remarkable for   elevated IgA-c/w BOWSER  Elevated

## 2021-09-08 NOTE — TELEPHONE ENCOUNTER
Nursing:  Can you please let the patient know that his labs show worsening of his lfts. Labs w/u remarkable suggest BOWSER, however IgM elevated-no ductal dilatation on ultrasound.     Sulfasalazine can also sometimes cause liver function abnormality, but

## 2021-09-09 ENCOUNTER — TELEPHONE (OUTPATIENT)
Dept: INTERNAL MEDICINE CLINIC | Facility: CLINIC | Age: 51
End: 2021-09-09

## 2021-09-09 DIAGNOSIS — D50.9 IRON DEFICIENCY ANEMIA, UNSPECIFIED IRON DEFICIENCY ANEMIA TYPE: Primary | ICD-10-CM

## 2021-09-10 LAB
DEPRECATED HBV CORE AB SER IA-ACNC: 144 NG/ML
IRON SATURATION: 15 %
IRON SERPL-MCNC: 81 UG/DL
TOTAL IRON BINDING CAPACITY: 524 UG/DL (ref 240–450)
TRANSFERRIN SERPL-MCNC: 352 MG/DL (ref 200–360)
TRANSFERRIN SERPL-MCNC: 352 MG/DL (ref 200–360)

## 2021-09-10 NOTE — TELEPHONE ENCOUNTER
DR Rose=just for clarification, do you want  patient to do  repeat iron level blood test eventhough it was just done on 9/8/21 ? You also have different note and recommendation under  lab  9/8/21. The only order I've seen was for the fecal occult blood.  Peggy Casiano

## 2021-09-13 NOTE — TELEPHONE ENCOUNTER
Called patient and reviewed PCP's result note and recommendations  Patient verbalized understanding and agreed to plan of care  He requested voicemail be left on his phone with referral information    Voicemail left with GI's information   Narayan Serrano

## 2021-09-22 ENCOUNTER — HOSPITAL ENCOUNTER (OUTPATIENT)
Dept: MRI IMAGING | Age: 51
Discharge: HOME | End: 2021-09-22
Attending: NURSE PRACTITIONER
Payer: COMMERCIAL

## 2021-09-22 ENCOUNTER — TELEPHONE (OUTPATIENT)
Dept: GASTROENTEROLOGY | Facility: CLINIC | Age: 51
End: 2021-09-22

## 2021-09-22 DIAGNOSIS — R74.01 TRANSAMINITIS: ICD-10-CM

## 2021-09-22 DIAGNOSIS — R76.8 HIGH TOTAL SERUM IGM: ICD-10-CM

## 2021-09-22 DIAGNOSIS — R74.8 ALKALINE PHOSPHATASE ELEVATION: ICD-10-CM

## 2021-09-22 DIAGNOSIS — K76.0 HEPATIC STEATOSIS: ICD-10-CM

## 2021-09-22 PROCEDURE — A9575 INJ GADOTERATE MEGLUMI 0.1ML: HCPCS | Performed by: NURSE PRACTITIONER

## 2021-09-22 PROCEDURE — 74183 MRI ABD W/O CNTR FLWD CNTR: CPT | Performed by: NURSE PRACTITIONER

## 2021-09-22 NOTE — TELEPHONE ENCOUNTER
Pt states his MRI results are in his My chart but he does not understand, needs clarification please calll

## 2021-09-22 NOTE — TELEPHONE ENCOUNTER
Long Allen,     Patient called in to review mri/mrcp results finalized today (09/22/2021). Attached impression below for review & recommendations if indicated. Thank you! Impression   CONCLUSION:   1.  Moderate hepatic steatosis again noted.  No s

## 2021-09-28 NOTE — TELEPHONE ENCOUNTER
rhiannonn attempted to contact pt. Lmtcb. Recommend:  Low-fat diet, weight loss  Consult w/ Dr. Luis A Duarte as previously recommended  Continue to monitor lfts  Consider repeat imaging in 6 mos for surveillance of borderline enlarged lymph nodes.     Nursing:  [place]

## 2021-09-28 NOTE — TELEPHONE ENCOUNTER
Entered into Epic. Recall mri/mrcp for 6 months (surveillance of borderline enlarged lymph nodes) per owen Fernandes. Last imaging done 09/22/2021. Recall entered into Patient Outreach for 03/22/2022.     Will await call back and/or follow up with O

## 2021-09-29 DIAGNOSIS — M06.9 RHEUMATOID ARTHRITIS INVOLVING MULTIPLE SITES, UNSPECIFIED WHETHER RHEUMATOID FACTOR PRESENT (HCC): ICD-10-CM

## 2021-09-29 RX ORDER — SULFASALAZINE 500 MG/1
1500 TABLET ORAL 2 TIMES DAILY
Qty: 180 TABLET | Refills: 1 | Status: SHIPPED | OUTPATIENT
Start: 2021-09-29

## 2021-09-29 NOTE — TELEPHONE ENCOUNTER
LOV: 9/8/21  Future Appointments   Date Time Provider Sean Rubalcava   11/10/2021 10:00 AM Ike Cummings MD [address] [facility]    LABS:    You were seen today for rheumatoid arthritis, symptoms are better controlled  Plan to continue sulfasalazine at 100

## 2021-09-29 NOTE — TELEPHONE ENCOUNTER
Current Outpatient Medications   Medication Sig Dispense Refill   • sulfaSALAzine 500 MG Oral Tab Take 3 tablets (1,500 mg total) by mouth 2 (two) times daily.  180 tablet 1

## 2021-10-01 NOTE — OPERATIVE REPORT
VIDEO CAPSULE ENDOSCOPY REPORT    Noni MURO 1970 Age 46year old   PCP Eagle Fair MD Gastroenterologist Nelia Castleman, MD       Date of procedure:     Pre-operative diagnosis: anemia    Post-operative diagnosis: normal capsule, no bleeding

## 2021-10-05 NOTE — TELEPHONE ENCOUNTER
Attempted to reach Geovanni Washburn to review aprn recommendations below. Left message to call back. 3rd attempt with no return call. Scheduled for follow up with owen Rae 10/13/2021.

## 2021-10-05 NOTE — TELEPHONE ENCOUNTER
Left detailed message with below recommendations. OK per cecil to leave detailed message on cell number.

## 2021-10-13 ENCOUNTER — OFFICE VISIT (OUTPATIENT)
Dept: GASTROENTEROLOGY | Facility: CLINIC | Age: 51
End: 2021-10-13
Payer: COMMERCIAL

## 2021-10-13 VITALS
SYSTOLIC BLOOD PRESSURE: 142 MMHG | HEART RATE: 75 BPM | DIASTOLIC BLOOD PRESSURE: 89 MMHG | HEIGHT: 68 IN | BODY MASS INDEX: 36.9 KG/M2 | TEMPERATURE: 99 F | WEIGHT: 243.5 LBS

## 2021-10-13 DIAGNOSIS — Z86.010 PERSONAL HISTORY OF COLONIC POLYPS: Primary | ICD-10-CM

## 2021-10-13 DIAGNOSIS — I10 PRIMARY HYPERTENSION: ICD-10-CM

## 2021-10-13 DIAGNOSIS — R79.89 ELEVATED LFTS: ICD-10-CM

## 2021-10-13 DIAGNOSIS — D50.9 IRON DEFICIENCY ANEMIA, UNSPECIFIED IRON DEFICIENCY ANEMIA TYPE: ICD-10-CM

## 2021-10-13 PROCEDURE — 3079F DIAST BP 80-89 MM HG: CPT | Performed by: NURSE PRACTITIONER

## 2021-10-13 PROCEDURE — 3077F SYST BP >= 140 MM HG: CPT | Performed by: NURSE PRACTITIONER

## 2021-10-13 PROCEDURE — 99215 OFFICE O/P EST HI 40 MIN: CPT | Performed by: NURSE PRACTITIONER

## 2021-10-13 PROCEDURE — 3008F BODY MASS INDEX DOCD: CPT | Performed by: NURSE PRACTITIONER

## 2021-10-13 NOTE — PROGRESS NOTES
[address] [facility] - Gastroenterology                                                                                                            Reason for consult: f/u kg)  08/12/21 : 230 lb (104.3 kg)  08/04/21 : 235 lb (106.6 kg)  07/28/21 : 245 lb (111.1 kg)  07/15/21 : 245 lb (111.1 kg)       History, Medications, Allergies, ROS:      Past Medical History:   Diagnosis Date   • PONV (postoperative nausea and vomiting) eyes    ROS:   CONSTITUTIONAL: negative for fevers, chills, sweats and weight loss  EYES Negative for red eyes, yellow eyes, changes in vision  HEENT: Negative for dysphagia and hoarseness  RESPIRATORY: Negative for cough and shortness of breath  CARDIOVAS 0.70 - 1.30 mg/dL    BUN/CREA Ratio 18.6 10.0 - 20.0    Calcium, Total 8.7 8.5 - 10.1 mg/dL    Calculated Osmolality 288 275 - 295 mOsm/kg    GFR, Non- 109 >=60    GFR, -American 126 >=60     (H) 16 - 61 U/L     (H) 15 condition decline.    -Make appt to see Dr. Renard Carrasquillo (liver MD)  -low-fat diet  -weight loss  -rheumatology/f/u as planned  -imaging 3/2022  -avoid alcohol, nsaids, acetaminophen  -er if jaundice, change in level of consciousness  -continue to monitor hgb-rep

## 2021-10-13 NOTE — PATIENT INSTRUCTIONS
-Make appt to see Dr. Charity Francis (liver MD) [phone]  -low-fat diet  -weight loss  -rheumatology/f/u as planned  -imaging 3/2022  -avoid alcohol, nsaids, acetaminophen  -er if jaundice, change in level of consciousness  -continue to monitor hgb-repeat labs

## 2021-10-23 ENCOUNTER — TELEPHONE (OUTPATIENT)
Dept: GASTROENTEROLOGY | Facility: CLINIC | Age: 51
End: 2021-10-23

## 2021-10-23 NOTE — TELEPHONE ENCOUNTER
Mr Rachid Cope had me paged as MD on call today Saturday to discuss his fatty liver disease diagnosis and recent Nando 89 10/13/2021. He calls to report new symptoms abdominal pain, bloating constipation since Wednesday this week. Feeling bloated.

## 2021-10-25 NOTE — TELEPHONE ENCOUNTER
apn contact pt to check status. He says that he did not try laxative. Is moving bowels c/w baseline. He denies abd pain. No fever/chills. No brbpr and/or melena. Recommend ctm. F/u if recurrence of symptoms.

## 2021-11-10 ENCOUNTER — OFFICE VISIT (OUTPATIENT)
Dept: RHEUMATOLOGY | Facility: CLINIC | Age: 51
End: 2021-11-10
Payer: COMMERCIAL

## 2021-11-10 VITALS
DIASTOLIC BLOOD PRESSURE: 86 MMHG | HEART RATE: 76 BPM | BODY MASS INDEX: 36.83 KG/M2 | HEIGHT: 68 IN | SYSTOLIC BLOOD PRESSURE: 158 MMHG | WEIGHT: 243 LBS

## 2021-11-10 DIAGNOSIS — M06.9 RHEUMATOID ARTHRITIS INVOLVING MULTIPLE SITES, UNSPECIFIED WHETHER RHEUMATOID FACTOR PRESENT (HCC): Primary | ICD-10-CM

## 2021-11-10 DIAGNOSIS — Z51.81 MEDICATION MONITORING ENCOUNTER: ICD-10-CM

## 2021-11-10 PROCEDURE — 3079F DIAST BP 80-89 MM HG: CPT | Performed by: INTERNAL MEDICINE

## 2021-11-10 PROCEDURE — 3077F SYST BP >= 140 MM HG: CPT | Performed by: INTERNAL MEDICINE

## 2021-11-10 PROCEDURE — 99214 OFFICE O/P EST MOD 30 MIN: CPT | Performed by: INTERNAL MEDICINE

## 2021-11-10 PROCEDURE — 3008F BODY MASS INDEX DOCD: CPT | Performed by: INTERNAL MEDICINE

## 2021-11-10 NOTE — PROGRESS NOTES
Rachid Cortez is a 46year old male. HPI:   Patient presents with:  Medication Follow-Up      I had the pleasure of seeing Rachid Cortez on 11/10/2021 for follow up Seropositive RA (+RF and CCP).      Current Medications:  SSZ 1000 mg AM and 500 mg PM- start specialist at the [address] [place] [address] and they noted he had osteoarthritis and was recommending gel injections  Tolerating sulfasalazine, never went up to 1500 mg twice a day    Today:  Patient presents for follow-up of seropositive RA (+ RF and CCP).  Also has .cmed  Allergies:    Seasonal                OTHER (SEE COMMENTS)    Comment:Runny Nose, watery eyes      ROS:   All other ROS are negative. PHYSICAL EXAM:   GEN: AAOx3, NAD  HEENT: EOMI, PERRLA, L eye redness  CVS: RRR, no murmurs rubs or gallops. >600 (H)    HEPATITIS B CORE AB, TOTAL      Nonreactive  Nonreactive    GLUCOSE-6-PHOSPHATE DEHYDROGEN      9.9 - 16.6 U/g Hb 16.1    HCV AB      Nonreactive  Nonreactive      Imaging:     US liver:  CONCLUSION:   1.  Findings suggestive of diffuse hepatic

## 2021-11-10 NOTE — PATIENT INSTRUCTIONS
You were seen today for rheumatoid arthritis  Symptoms are better controlled  Continue sulfasalazine 1000 in the morning and 500 at night  Blood work in December and March  Follow-up in February or March

## 2021-11-22 ENCOUNTER — OFFICE VISIT (OUTPATIENT)
Dept: SURGERY | Facility: CLINIC | Age: 51
End: 2021-11-22
Payer: COMMERCIAL

## 2021-11-22 VITALS
RESPIRATION RATE: 16 BRPM | OXYGEN SATURATION: 97 % | BODY MASS INDEX: 38 KG/M2 | TEMPERATURE: 96 F | WEIGHT: 252 LBS | HEART RATE: 74 BPM | SYSTOLIC BLOOD PRESSURE: 160 MMHG | DIASTOLIC BLOOD PRESSURE: 103 MMHG

## 2021-11-22 DIAGNOSIS — R79.89 ELEVATED LIVER FUNCTION TESTS: Primary | ICD-10-CM

## 2021-11-22 DIAGNOSIS — K76.0 NAFLD (NONALCOHOLIC FATTY LIVER DISEASE): ICD-10-CM

## 2021-11-22 NOTE — PROGRESS NOTES
[facility] at [facility]  [address] [facility], [address]  [address]  [phone] ([phone]) 11/22/2021), Disp: 180 tablet, Rfl: 1  •  Ferrous Sulfate 324 (65 Fe) MG Oral Tab EC, Take 1 tablet by mouth daily.  (Patient not taking: Reported on 11/22/2021), Disp: 30 tablet, Rfl: 0  •  methylPREDNISolone 4 MG Oral Tablet Therapy Pack, Take as directed

## 2022-01-27 ENCOUNTER — TELEPHONE (OUTPATIENT)
Dept: RHEUMATOLOGY | Facility: CLINIC | Age: 52
End: 2022-01-27

## 2022-01-27 RX ORDER — PREDNISONE 10 MG/1
TABLET ORAL
Qty: 30 TABLET | Refills: 0 | Status: SHIPPED | OUTPATIENT
Start: 2022-01-27

## 2022-01-27 NOTE — TELEPHONE ENCOUNTER
Pt states that his arthritis is really bad. Pt states that the medication is not working. Pt states that the flare up is really bad.

## 2022-01-27 NOTE — TELEPHONE ENCOUNTER
Called patient back. He developed pain and swelling in his hands, shoulders, back. Took a Medrol Dosepak but did not help. He also increase his sulfasalazine to 3 pills twice a day. I will prescribe a higher dose of prednisone.   Advised him that he may

## 2022-01-27 NOTE — TELEPHONE ENCOUNTER
Patient states having flare up for a week and he is having pain all over his body rating at 8/10. He states he did take prednisone pack that ended on Sunday. It did not help. He is taking sulfasalazine as prescribed.  He would like a call back from provider

## 2022-02-01 ENCOUNTER — LAB ENCOUNTER (OUTPATIENT)
Dept: LAB | Facility: HOSPITAL | Age: 52
End: 2022-02-01
Attending: INTERNAL MEDICINE
Payer: COMMERCIAL

## 2022-02-01 ENCOUNTER — OFFICE VISIT (OUTPATIENT)
Dept: RHEUMATOLOGY | Facility: CLINIC | Age: 52
End: 2022-02-01
Payer: COMMERCIAL

## 2022-02-01 VITALS
DIASTOLIC BLOOD PRESSURE: 95 MMHG | HEIGHT: 68 IN | HEART RATE: 109 BPM | RESPIRATION RATE: 16 BRPM | SYSTOLIC BLOOD PRESSURE: 144 MMHG | BODY MASS INDEX: 36.37 KG/M2 | WEIGHT: 240 LBS

## 2022-02-01 DIAGNOSIS — M25.572 ACUTE LEFT ANKLE PAIN: ICD-10-CM

## 2022-02-01 DIAGNOSIS — M06.9 RHEUMATOID ARTHRITIS INVOLVING MULTIPLE SITES, UNSPECIFIED WHETHER RHEUMATOID FACTOR PRESENT (HCC): ICD-10-CM

## 2022-02-01 DIAGNOSIS — R79.89 ELEVATED LIVER FUNCTION TESTS: ICD-10-CM

## 2022-02-01 DIAGNOSIS — M06.9 RHEUMATOID ARTHRITIS INVOLVING MULTIPLE SITES, UNSPECIFIED WHETHER RHEUMATOID FACTOR PRESENT (HCC): Primary | ICD-10-CM

## 2022-02-01 DIAGNOSIS — Z51.81 MEDICATION MONITORING ENCOUNTER: ICD-10-CM

## 2022-02-01 LAB
ALBUMIN SERPL-MCNC: 3.1 G/DL (ref 3.4–5)
ALBUMIN SERPL-MCNC: 3.1 G/DL (ref 3.4–5)
ALP LIVER SERPL-CCNC: 129 U/L
ALT SERPL-CCNC: 32 U/L
ALT SERPL-CCNC: 32 U/L
AST SERPL-CCNC: 19 U/L (ref 15–37)
AST SERPL-CCNC: 19 U/L (ref 15–37)
BASOPHILS # BLD AUTO: 0.03 X10(3) UL (ref 0–0.2)
BASOPHILS NFR BLD AUTO: 0.3 %
BILIRUB DIRECT SERPL-MCNC: 0.2 MG/DL (ref 0–0.2)
BILIRUB SERPL-MCNC: 0.6 MG/DL (ref 0.1–2)
CK SERPL-CCNC: 35 U/L
CREAT BLD-MCNC: 0.76 MG/DL
DEPRECATED RDW RBC AUTO: 39.8 FL (ref 35.1–46.3)
EOSINOPHIL # BLD AUTO: 0 X10(3) UL (ref 0–0.7)
EOSINOPHIL NFR BLD AUTO: 0 %
ERYTHROCYTE [DISTWIDTH] IN BLOOD BY AUTOMATED COUNT: 13.6 % (ref 11–15)
ERYTHROCYTE [SEDIMENTATION RATE] IN BLOOD: 60 MM/HR
GGT SERPL-CCNC: 71 U/L
HCT VFR BLD AUTO: 46 %
HGB BLD-MCNC: 15 G/DL
IMM GRANULOCYTES # BLD AUTO: 0.07 X10(3) UL (ref 0–1)
IMM GRANULOCYTES NFR BLD: 0.6 %
LYMPHOCYTES # BLD AUTO: 2.08 X10(3) UL (ref 1–4)
LYMPHOCYTES NFR BLD AUTO: 17.4 %
MCH RBC QN AUTO: 26.1 PG (ref 26–34)
MCHC RBC AUTO-ENTMCNC: 32.6 G/DL (ref 31–37)
MONOCYTES # BLD AUTO: 0.93 X10(3) UL (ref 0.1–1)
MONOCYTES NFR BLD AUTO: 7.8 %
NEUTROPHILS # BLD AUTO: 8.85 X10 (3) UL (ref 1.5–7.7)
NEUTROPHILS # BLD AUTO: 8.85 X10(3) UL (ref 1.5–7.7)
PLATELET # BLD AUTO: 331 10(3)UL (ref 150–450)
PROT SERPL-MCNC: 7.6 G/DL (ref 6.4–8.2)
RBC # BLD AUTO: 5.74 X10(6)UL
WBC # BLD AUTO: 12 X10(3) UL (ref 4–11)

## 2022-02-01 PROCEDURE — 84450 TRANSFERASE (AST) (SGOT): CPT

## 2022-02-01 PROCEDURE — 82977 ASSAY OF GGT: CPT

## 2022-02-01 PROCEDURE — 80076 HEPATIC FUNCTION PANEL: CPT

## 2022-02-01 PROCEDURE — 84080 ASSAY ALKALINE PHOSPHATASES: CPT

## 2022-02-01 PROCEDURE — 20605 DRAIN/INJ JOINT/BURSA W/O US: CPT | Performed by: INTERNAL MEDICINE

## 2022-02-01 PROCEDURE — 3080F DIAST BP >= 90 MM HG: CPT | Performed by: INTERNAL MEDICINE

## 2022-02-01 PROCEDURE — 84460 ALANINE AMINO (ALT) (SGPT): CPT

## 2022-02-01 PROCEDURE — 3077F SYST BP >= 140 MM HG: CPT | Performed by: INTERNAL MEDICINE

## 2022-02-01 PROCEDURE — 85025 COMPLETE CBC W/AUTO DIFF WBC: CPT

## 2022-02-01 PROCEDURE — 36415 COLL VENOUS BLD VENIPUNCTURE: CPT

## 2022-02-01 PROCEDURE — 82040 ASSAY OF SERUM ALBUMIN: CPT

## 2022-02-01 PROCEDURE — 84075 ASSAY ALKALINE PHOSPHATASE: CPT

## 2022-02-01 PROCEDURE — 3008F BODY MASS INDEX DOCD: CPT | Performed by: INTERNAL MEDICINE

## 2022-02-01 PROCEDURE — 82550 ASSAY OF CK (CPK): CPT

## 2022-02-01 PROCEDURE — 82565 ASSAY OF CREATININE: CPT

## 2022-02-01 PROCEDURE — 99214 OFFICE O/P EST MOD 30 MIN: CPT | Performed by: INTERNAL MEDICINE

## 2022-02-01 PROCEDURE — 86140 C-REACTIVE PROTEIN: CPT

## 2022-02-01 PROCEDURE — 85652 RBC SED RATE AUTOMATED: CPT

## 2022-02-01 RX ORDER — TRIAMCINOLONE ACETONIDE 40 MG/ML
20 INJECTION, SUSPENSION INTRA-ARTICULAR; INTRAMUSCULAR ONCE
Status: COMPLETED | OUTPATIENT
Start: 2022-02-01 | End: 2022-02-01

## 2022-02-01 RX ORDER — PREDNISONE 10 MG/1
TABLET ORAL
Qty: 21 TABLET | Refills: 0 | Status: SHIPPED | OUTPATIENT
Start: 2022-02-01

## 2022-02-01 RX ADMIN — TRIAMCINOLONE ACETONIDE 20 MG: 40 INJECTION, SUSPENSION INTRA-ARTICULAR; INTRAMUSCULAR at 17:18:00

## 2022-02-01 NOTE — TELEPHONE ENCOUNTER
Per patient, he is taking the recommended dosage that doctor gave but per patient his left foot is still swollen and he is still having flare up all over his body and did not go to work, patient would like to know what else to do.

## 2022-02-01 NOTE — TELEPHONE ENCOUNTER
Patient scheduled.      Future Appointments   Date Time Provider Sean Rubalcava   2/1/2022  4:50 PM Irene Jane MD [address] [facility]  [facility]   2/15/2022  2:30 PM ELIJAH [facility]   2/28/2022  3:45 PM Sheyla Mcleod MD

## 2022-02-01 NOTE — PATIENT INSTRUCTIONS
1. Rest left ankle x 3 dasy   2. Take sulfasalazine 1500mg in am and 1500mg in pm   3. Ok to try to take pred 20mg in am and if still having pain by evening - take 40mg a day - additiona   Then take pred 10m -  take 5 tabsx 1 day, take 4 tabsx 1 day,take 3 tabsx 1 day, take 2 tabsx 1 day, take 1 tabsx 1 day, then off  4.  Follow up with dr. Rebeka Garcia

## 2022-02-01 NOTE — TELEPHONE ENCOUNTER
Patient expressed that he feels that the prednisone and the sulfasalazine are not helping. States he was going to stop the prednisone because it has not worked. Advised him to continue has prescribed so he can continue to taper his dose.  He has missed 4 da

## 2022-02-01 NOTE — PROCEDURES
With  consent, I injected the patient's left ankle with 0.5ml lidocaine  1% and 0.5ml kenalog 40. It was under sterile technique using iodine and alcohol swabs and ethyl chloride was used as an anaesthetic spray. Pt.  tolerated it well.

## 2022-02-04 LAB
ALK-PHOSPHATASE BONE CALC: 60 U/L
ALK-PHOSPHATASE LIVER CALC: 73 U/L
ALK-PHOSPHATASE OTHER CALC: 0 U/L
ALKALINE PHOSPHATASE: 133 U/L

## 2022-02-09 ENCOUNTER — OFFICE VISIT (OUTPATIENT)
Dept: RHEUMATOLOGY | Facility: CLINIC | Age: 52
End: 2022-02-09
Payer: COMMERCIAL

## 2022-02-09 VITALS — HEIGHT: 68 IN | BODY MASS INDEX: 36.37 KG/M2 | WEIGHT: 240 LBS

## 2022-02-09 DIAGNOSIS — M06.9 RHEUMATOID ARTHRITIS INVOLVING MULTIPLE SITES, UNSPECIFIED WHETHER RHEUMATOID FACTOR PRESENT (HCC): ICD-10-CM

## 2022-02-09 DIAGNOSIS — Z51.81 MEDICATION MONITORING ENCOUNTER: Primary | ICD-10-CM

## 2022-02-09 PROCEDURE — 3008F BODY MASS INDEX DOCD: CPT | Performed by: INTERNAL MEDICINE

## 2022-02-09 PROCEDURE — 99214 OFFICE O/P EST MOD 30 MIN: CPT | Performed by: INTERNAL MEDICINE

## 2022-02-09 RX ORDER — HYDROXYCHLOROQUINE SULFATE 200 MG/1
400 TABLET, FILM COATED ORAL DAILY
Qty: 180 TABLET | Refills: 0 | Status: SHIPPED | OUTPATIENT
Start: 2022-02-09

## 2022-02-09 RX ORDER — SULFASALAZINE 500 MG/1
1500 TABLET ORAL 2 TIMES DAILY
Qty: 180 TABLET | Refills: 1 | Status: SHIPPED | OUTPATIENT
Start: 2022-02-09 | End: 2022-03-09

## 2022-02-09 NOTE — PATIENT INSTRUCTIONS
You were seen today for rheumatoid arthritis  Continue sulfasalazine 1500 mg twice a day  We will be adding Plaquenil 400 mg daily  We will have to get your eyes checked within 3 to 6 months of taking Plaquenil  We may have to consider Humira if you continue to have joint pain and swelling  Follow-up in the next 2 months

## 2022-02-15 ENCOUNTER — OFFICE VISIT (OUTPATIENT)
Dept: GASTROENTEROLOGY | Facility: CLINIC | Age: 52
End: 2022-02-15
Payer: COMMERCIAL

## 2022-02-15 VITALS
DIASTOLIC BLOOD PRESSURE: 89 MMHG | WEIGHT: 241 LBS | HEART RATE: 88 BPM | BODY MASS INDEX: 36.53 KG/M2 | HEIGHT: 68 IN | SYSTOLIC BLOOD PRESSURE: 134 MMHG

## 2022-02-15 DIAGNOSIS — K76.0 NAFLD (NONALCOHOLIC FATTY LIVER DISEASE): ICD-10-CM

## 2022-02-15 DIAGNOSIS — R74.8 ELEVATED ALKALINE PHOSPHATASE LEVEL: Primary | ICD-10-CM

## 2022-02-15 DIAGNOSIS — Z86.010 PERSONAL HISTORY OF COLONIC POLYPS: ICD-10-CM

## 2022-02-15 PROCEDURE — 99214 OFFICE O/P EST MOD 30 MIN: CPT | Performed by: NURSE PRACTITIONER

## 2022-02-15 PROCEDURE — 3079F DIAST BP 80-89 MM HG: CPT | Performed by: NURSE PRACTITIONER

## 2022-02-15 PROCEDURE — 3075F SYST BP GE 130 - 139MM HG: CPT | Performed by: NURSE PRACTITIONER

## 2022-02-15 PROCEDURE — 3008F BODY MASS INDEX DOCD: CPT | Performed by: NURSE PRACTITIONER

## 2022-02-15 NOTE — PATIENT INSTRUCTIONS
-low-fat diet  -continue efforts towards weight loss  -ultrasound elastography per hepatology  -follow-up with hepatology as scheduled  -discuss results of alkaline phosphatase with primary care and consider imaging/further work-up if indicated  -colonoscopy 7/2026 unless otherwise indicated

## 2022-03-09 RX ORDER — SULFASALAZINE 500 MG/1
1500 TABLET ORAL 2 TIMES DAILY
Qty: 180 TABLET | Refills: 1 | Status: SHIPPED | OUTPATIENT
Start: 2022-03-09

## 2022-05-07 RX ORDER — HYDROXYCHLOROQUINE SULFATE 200 MG/1
400 TABLET, FILM COATED ORAL DAILY
Qty: 180 TABLET | Refills: 0 | Status: SHIPPED | OUTPATIENT
Start: 2022-05-07

## 2022-05-07 NOTE — TELEPHONE ENCOUNTER
LOV: 2/9/2022-medication added at this office visit. Eye exam?  No future appointments. Labs:   Component      Latest Ref Rng & Units 2/1/2022   WBC      4.0 - 11.0 x10(3) uL 12.0 (H)   RBC      4.30 - 5.70 x10(6)uL 5.74 (H)   Hemoglobin      13.0 - 17.5 g/dL 15.0   Hematocrit      39.0 - 53.0 % 46.0   MCV      80.0 - 100.0 fL 80.1   MCH      26.0 - 34.0 pg 26.1   MCHC      31.0 - 37.0 g/dL 32.6   RDW-SD      35.1 - 46.3 fL 39.8   RDW      11.0 - 15.0 % 13.6   Platelet Count      358.5 - 450.0 10(3)uL 331.0   Prelim Neutrophil Abs      1.50 - 7.70 x10 (3) uL 8.85 (H)   Neutrophils Absolute      1.50 - 7.70 x10(3) uL 8.85 (H)   Lymphocytes Absolute      1.00 - 4.00 x10(3) uL 2.08   Monocytes Absolute      0.10 - 1.00 x10(3) uL 0.93   Eosinophils Absolute      0.00 - 0.70 x10(3) uL 0.00   Basophils Absolute      0.00 - 0.20 x10(3) uL 0.03   Immature Granulocyte Absolute      0.00 - 1.00 x10(3) uL 0.07   Neutrophils %      % 73.9   Lymphocytes %      % 17.4   Monocytes %      % 7.8   Eosinophils %      % 0.0   Basophils %      % 0.3   Immature Granulocyte %      % 0.6   CREATININE      0.70 - 1.30 mg/dL 0.76   eGFR NON-AFR.  AMERICAN      >=60 106   eGFR       >=60 122   SED RATE      0 - 20 mm/Hr 60 (H)   C-REACTIVE PROTEIN      <0.30 mg/dL 12.20 (H)   AST (SGOT)      15 - 37 U/L 19   ALT (SGPT)      16 - 61 U/L 32   Albumin      3.4 - 5.0 g/dL 3.1 (L)

## 2022-06-02 ENCOUNTER — TELEPHONE (OUTPATIENT)
Dept: GASTROENTEROLOGY | Facility: CLINIC | Age: 52
End: 2022-06-02

## 2022-06-13 DIAGNOSIS — M06.9 RHEUMATOID ARTHRITIS INVOLVING MULTIPLE SITES, UNSPECIFIED WHETHER RHEUMATOID FACTOR PRESENT (HCC): ICD-10-CM

## 2022-06-13 RX ORDER — SULFASALAZINE 500 MG/1
1500 TABLET ORAL 2 TIMES DAILY
Qty: 180 TABLET | Refills: 1 | Status: SHIPPED | OUTPATIENT
Start: 2022-06-13

## 2022-06-13 NOTE — TELEPHONE ENCOUNTER
LOV: 2/9/22  No future appointments. LABS:  Component      Latest Ref Rng & Units 2/1/2022   WBC      4.0 - 11.0 x10(3) uL 12.0 (H)   RBC      4.30 - 5.70 x10(6)uL 5.74 (H)   Hemoglobin      13.0 - 17.5 g/dL 15.0   Hematocrit      39.0 - 53.0 % 46.0   MCV      80.0 - 100.0 fL 80.1   MCH      26.0 - 34.0 pg 26.1   MCHC      31.0 - 37.0 g/dL 32.6   RDW-SD      35.1 - 46.3 fL 39.8   RDW      11.0 - 15.0 % 13.6   Platelet Count      618.2 - 450.0 10(3)uL 331.0   Prelim Neutrophil Abs      1.50 - 7.70 x10 (3) uL 8.85 (H)   Neutrophils Absolute      1.50 - 7.70 x10(3) uL 8.85 (H)   Lymphocytes Absolute      1.00 - 4.00 x10(3) uL 2.08   Monocytes Absolute      0.10 - 1.00 x10(3) uL 0.93   Eosinophils Absolute      0.00 - 0.70 x10(3) uL 0.00   Basophils Absolute      0.00 - 0.20 x10(3) uL 0.03   Immature Granulocyte Absolute      0.00 - 1.00 x10(3) uL 0.07   Neutrophils %      % 73.9   Lymphocytes %      % 17.4   Monocytes %      % 7.8   Eosinophils %      % 0.0   Basophils %      % 0.3   Immature Granulocyte %      % 0.6   CREATININE      0.70 - 1.30 mg/dL 0.76   eGFR NON-AFR.  AMERICAN      >=60 106   eGFR       >=60 122   SED RATE      0 - 20 mm/Hr 60 (H)   C-REACTIVE PROTEIN      <0.30 mg/dL 12.20 (H)   AST (SGOT)      15 - 37 U/L 19   ALT (SGPT)      16 - 61 U/L 32   Albumin      3.4 - 5.0 g/dL 3.1 (L)     You were seen today for rheumatoid arthritis  Continue sulfasalazine 1500 mg twice a day  We will be adding Plaquenil 400 mg daily  We will have to get your eyes checked within 3 to 6 months of taking Plaquenil  We may have to consider Humira if you continue to have joint pain and swelling  Follow-up in the next 2 months

## 2022-08-09 DIAGNOSIS — M06.9 RHEUMATOID ARTHRITIS INVOLVING MULTIPLE SITES, UNSPECIFIED WHETHER RHEUMATOID FACTOR PRESENT (HCC): ICD-10-CM

## 2022-08-09 RX ORDER — SULFASALAZINE 500 MG/1
1500 TABLET ORAL 2 TIMES DAILY
Qty: 180 TABLET | Refills: 1 | Status: SHIPPED | OUTPATIENT
Start: 2022-08-09

## 2022-08-09 NOTE — TELEPHONE ENCOUNTER
LOV: 2/9/22  No future appointments. LABS:  Component      Latest Ref Rng & Units 2/1/2022   WBC      4.0 - 11.0 x10(3) uL 12.0 (H)   RBC      4.30 - 5.70 x10(6)uL 5.74 (H)   Hemoglobin      13.0 - 17.5 g/dL 15.0   Hematocrit      39.0 - 53.0 % 46.0   MCV      80.0 - 100.0 fL 80.1   MCH      26.0 - 34.0 pg 26.1   MCHC      31.0 - 37.0 g/dL 32.6   RDW-SD      35.1 - 46.3 fL 39.8   RDW      11.0 - 15.0 % 13.6   Platelet Count      763.9 - 450.0 10(3)uL 331.0   Prelim Neutrophil Abs      1.50 - 7.70 x10 (3) uL 8.85 (H)   Neutrophils Absolute      1.50 - 7.70 x10(3) uL 8.85 (H)   Lymphocytes Absolute      1.00 - 4.00 x10(3) uL 2.08   Monocytes Absolute      0.10 - 1.00 x10(3) uL 0.93   Eosinophils Absolute      0.00 - 0.70 x10(3) uL 0.00   Basophils Absolute      0.00 - 0.20 x10(3) uL 0.03   Immature Granulocyte Absolute      0.00 - 1.00 x10(3) uL 0.07   Neutrophils %      % 73.9   Lymphocytes %      % 17.4   Monocytes %      % 7.8   Eosinophils %      % 0.0   Basophils %      % 0.3   Immature Granulocyte %      % 0.6   CREATININE      0.70 - 1.30 mg/dL 0.76   eGFR NON-AFR.  AMERICAN      >=60 106   eGFR       >=60 122   SED RATE      0 - 20 mm/Hr 60 (H)   C-REACTIVE PROTEIN      <0.30 mg/dL 12.20 (H)   AST (SGOT)      15 - 37 U/L 19   ALT (SGPT)      16 - 61 U/L 32   Albumin      3.4 - 5.0 g/dL 3.1 (L)     You were seen today for rheumatoid arthritis  Continue sulfasalazine 1500 mg twice a day  We will be adding Plaquenil 400 mg daily  We will have to get your eyes checked within 3 to 6 months of taking Plaquenil  We may have to consider Humira if you continue to have joint pain and swelling  Follow-up in the next 2 months

## 2022-08-12 NOTE — TELEPHONE ENCOUNTER
LOV: 2/9/2022 eye exam?  No future appointments. Labs:   Component      Latest Ref Rng & Units 2/1/2022   WBC      4.0 - 11.0 x10(3) uL 12.0 (H)   RBC      4.30 - 5.70 x10(6)uL 5.74 (H)   Hemoglobin      13.0 - 17.5 g/dL 15.0   Hematocrit      39.0 - 53.0 % 46.0   MCV      80.0 - 100.0 fL 80.1   MCH      26.0 - 34.0 pg 26.1   MCHC      31.0 - 37.0 g/dL 32.6   RDW-SD      35.1 - 46.3 fL 39.8   RDW      11.0 - 15.0 % 13.6   Platelet Count      572.8 - 450.0 10(3)uL 331.0   Prelim Neutrophil Abs      1.50 - 7.70 x10 (3) uL 8.85 (H)   Neutrophils Absolute      1.50 - 7.70 x10(3) uL 8.85 (H)   Lymphocytes Absolute      1.00 - 4.00 x10(3) uL 2.08   Monocytes Absolute      0.10 - 1.00 x10(3) uL 0.93   Eosinophils Absolute      0.00 - 0.70 x10(3) uL 0.00   Basophils Absolute      0.00 - 0.20 x10(3) uL 0.03   Immature Granulocyte Absolute      0.00 - 1.00 x10(3) uL 0.07   Neutrophils %      % 73.9   Lymphocytes %      % 17.4   Monocytes %      % 7.8   Eosinophils %      % 0.0   Basophils %      % 0.3   Immature Granulocyte %      % 0.6   CREATININE      0.70 - 1.30 mg/dL 0.76   eGFR NON-AFR.  AMERICAN      >=60 106   eGFR       >=60 122   SED RATE      0 - 20 mm/Hr 60 (H)   C-REACTIVE PROTEIN      <0.30 mg/dL 12.20 (H)   AST (SGOT)      15 - 37 U/L 19   ALT (SGPT)      16 - 61 U/L 32   Albumin      3.4 - 5.0 g/dL 3.1 (L)

## 2022-08-15 RX ORDER — HYDROXYCHLOROQUINE SULFATE 200 MG/1
400 TABLET, FILM COATED ORAL DAILY
Qty: 180 TABLET | Refills: 0 | Status: SHIPPED | OUTPATIENT
Start: 2022-08-15

## 2022-10-14 DIAGNOSIS — M06.9 RHEUMATOID ARTHRITIS INVOLVING MULTIPLE SITES, UNSPECIFIED WHETHER RHEUMATOID FACTOR PRESENT (HCC): ICD-10-CM

## 2022-10-14 RX ORDER — SULFASALAZINE 500 MG/1
1500 TABLET ORAL 2 TIMES DAILY
Qty: 180 TABLET | Refills: 1 | Status: SHIPPED | OUTPATIENT
Start: 2022-10-14

## 2022-10-14 NOTE — TELEPHONE ENCOUNTER
LOV: 2/9/22  No future appointments. LABS:  Component      Latest Ref Rng & Units 2/1/2022   WBC      4.0 - 11.0 x10(3) uL 12.0 (H)   RBC      4.30 - 5.70 x10(6)uL 5.74 (H)   Hemoglobin      13.0 - 17.5 g/dL 15.0   Hematocrit      39.0 - 53.0 % 46.0   MCV      80.0 - 100.0 fL 80.1   MCH      26.0 - 34.0 pg 26.1   MCHC      31.0 - 37.0 g/dL 32.6   RDW-SD      35.1 - 46.3 fL 39.8   RDW      11.0 - 15.0 % 13.6   Platelet Count      062.6 - 450.0 10(3)uL 331.0   Prelim Neutrophil Abs      1.50 - 7.70 x10 (3) uL 8.85 (H)   Neutrophils Absolute      1.50 - 7.70 x10(3) uL 8.85 (H)   Lymphocytes Absolute      1.00 - 4.00 x10(3) uL 2.08   Monocytes Absolute      0.10 - 1.00 x10(3) uL 0.93   Eosinophils Absolute      0.00 - 0.70 x10(3) uL 0.00   Basophils Absolute      0.00 - 0.20 x10(3) uL 0.03   Immature Granulocyte Absolute      0.00 - 1.00 x10(3) uL 0.07   Neutrophils %      % 73.9   Lymphocytes %      % 17.4   Monocytes %      % 7.8   Eosinophils %      % 0.0   Basophils %      % 0.3   Immature Granulocyte %      % 0.6   CREATININE      0.70 - 1.30 mg/dL 0.76   eGFR NON-AFR.  AMERICAN      >=60 106   eGFR       >=60 122   SED RATE      0 - 20 mm/Hr 60 (H)   C-REACTIVE PROTEIN      <0.30 mg/dL 12.20 (H)   AST (SGOT)      15 - 37 U/L 19   ALT (SGPT)      16 - 61 U/L 32   Albumin      3.4 - 5.0 g/dL 3.1 (L)     You were seen today for rheumatoid arthritis  Continue sulfasalazine 1500 mg twice a day  We will be adding Plaquenil 400 mg daily  We will have to get your eyes checked within 3 to 6 months of taking Plaquenil  We may have to consider Humira if you continue to have joint pain and swelling  Follow-up in the next 2 months

## 2022-11-21 ENCOUNTER — TELEPHONE (OUTPATIENT)
Dept: RHEUMATOLOGY | Facility: CLINIC | Age: 52
End: 2022-11-21

## 2022-11-21 RX ORDER — PREDNISONE 10 MG/1
TABLET ORAL
Qty: 20 TABLET | Refills: 0 | Status: SHIPPED | OUTPATIENT
Start: 2022-11-21

## 2022-11-21 RX ORDER — PREDNISONE 10 MG/1
TABLET ORAL
Qty: 21 TABLET | Refills: 0 | Status: CANCELLED | OUTPATIENT
Start: 2022-11-21

## 2022-11-21 RX ORDER — PREDNISONE 10 MG/1
TABLET ORAL
Qty: 20 TABLET | Refills: 0 | Status: CANCELLED | OUTPATIENT
Start: 2022-11-21

## 2022-11-21 RX ORDER — PREDNISONE 10 MG/1
TABLET ORAL
Qty: 30 TABLET | Refills: 0 | Status: CANCELLED | OUTPATIENT
Start: 2022-11-21

## 2022-11-21 NOTE — TELEPHONE ENCOUNTER
Please see below. Unable to speak with pt at this time. Spouse reports pt is in a lot of pain. Difficulty walking and need a refill of prednisone. Please advise. Spouse reminded pt over due for labs and follow up appointment.

## 2022-11-21 NOTE — TELEPHONE ENCOUNTER
Spouse, states that the patient is in pain with his arthritis.  Spouse, states that the patient was not there to speak with the nurse, Also, please see encounter of refill request.

## 2022-11-21 NOTE — TELEPHONE ENCOUNTER
Spouse/Jessa -[phone] is calling for status of the medication refill request. Spouse, states that the patient is out of medication.

## 2022-11-21 NOTE — TELEPHONE ENCOUNTER
Disp Refills Start End    predniSONE 10 MG Oral Tab 30 tablet 0 1/27/2022       LOV: 2/9/22  No future appointments. Labs:   Component      Latest Ref Rng & Units 2/1/2022   WBC      4.0 - 11.0 x10(3) uL 12.0 (H)   RBC      4.30 - 5.70 x10(6)uL 5.74 (H)   Hemoglobin      13.0 - 17.5 g/dL 15.0   Hematocrit      39.0 - 53.0 % 46.0   MCV      80.0 - 100.0 fL 80.1   MCH      26.0 - 34.0 pg 26.1   MCHC      31.0 - 37.0 g/dL 32.6   RDW-SD      35.1 - 46.3 fL 39.8   RDW      11.0 - 15.0 % 13.6   Platelet Count      158.7 - 450.0 10(3)uL 331.0   Prelim Neutrophil Abs      1.50 - 7.70 x10 (3) uL 8.85 (H)   Neutrophils Absolute      1.50 - 7.70 x10(3) uL 8.85 (H)   Lymphocytes Absolute      1.00 - 4.00 x10(3) uL 2.08   Monocytes Absolute      0.10 - 1.00 x10(3) uL 0.93   Eosinophils Absolute      0.00 - 0.70 x10(3) uL 0.00   Basophils Absolute      0.00 - 0.20 x10(3) uL 0.03   Immature Granulocyte Absolute      0.00 - 1.00 x10(3) uL 0.07   Neutrophils %      % 73.9   Lymphocytes %      % 17.4   Monocytes %      % 7.8   Eosinophils %      % 0.0   Basophils %      % 0.3   Immature Granulocyte %      % 0.6   CREATININE      0.70 - 1.30 mg/dL 0.76   eGFR NON-AFR.  AMERICAN      >=60 106   eGFR       >=60 122   SED RATE      0 - 20 mm/Hr 60 (H)   C-REACTIVE PROTEIN      <0.30 mg/dL 12.20 (H)   AST (SGOT)      15 - 37 U/L 19   ALT (SGPT)      16 - 61 U/L 32   Albumin      3.4 - 5.0 g/dL 3.1 (L)     Instructions    You were seen today for rheumatoid arthritis  Continue sulfasalazine 1500 mg twice a day  We will be adding Plaquenil 400 mg daily  We will have to get your eyes checked within 3 to 6 months of taking Plaquenil  We may have to consider Humira if you continue to have joint pain and swelling  Follow-up in the next 2 months

## 2022-11-22 RX ORDER — PREDNISONE 10 MG/1
TABLET ORAL
Qty: 21 TABLET | Refills: 0 | OUTPATIENT
Start: 2022-11-22

## 2022-11-22 NOTE — TELEPHONE ENCOUNTER
Left message for pt prednisone taper sent to the pharmacy and to schedule follow up appointment with provider.

## 2022-12-09 ENCOUNTER — TELEPHONE (OUTPATIENT)
Dept: RHEUMATOLOGY | Facility: CLINIC | Age: 52
End: 2022-12-09

## 2022-12-09 DIAGNOSIS — M06.9 RHEUMATOID ARTHRITIS INVOLVING MULTIPLE SITES, UNSPECIFIED WHETHER RHEUMATOID FACTOR PRESENT (HCC): ICD-10-CM

## 2022-12-09 RX ORDER — SULFASALAZINE 500 MG/1
1500 TABLET ORAL 2 TIMES DAILY
Qty: 180 TABLET | Refills: 0 | Status: SHIPPED | OUTPATIENT
Start: 2022-12-09

## 2022-12-09 NOTE — TELEPHONE ENCOUNTER
Pt contacted and appointment scheduled. He will have monitoring labs completed 1 week prior to appointment.       Future Appointments   Date Time Provider Sean Rubalcava   1/7/2023  8:40 AM Rohan Poe MD 2014 [facility]

## 2022-12-09 NOTE — TELEPHONE ENCOUNTER
LOV: 2/9/2022 Send a 7 days supply with message pt needs to follow up for further refills? No future appointments. Labs:   Component      Latest Ref Rng & Units 2/1/2022   WBC      4.0 - 11.0 x10(3) uL 12.0 (H)   RBC      4.30 - 5.70 x10(6)uL 5.74 (H)   Hemoglobin      13.0 - 17.5 g/dL 15.0   Hematocrit      39.0 - 53.0 % 46.0   MCV      80.0 - 100.0 fL 80.1   MCH      26.0 - 34.0 pg 26.1   MCHC      31.0 - 37.0 g/dL 32.6   RDW-SD      35.1 - 46.3 fL 39.8   RDW      11.0 - 15.0 % 13.6   Platelet Count      812.1 - 450.0 10(3)uL 331.0   Prelim Neutrophil Abs      1.50 - 7.70 x10 (3) uL 8.85 (H)   Neutrophils Absolute      1.50 - 7.70 x10(3) uL 8.85 (H)   Lymphocytes Absolute      1.00 - 4.00 x10(3) uL 2.08   Monocytes Absolute      0.10 - 1.00 x10(3) uL 0.93   Eosinophils Absolute      0.00 - 0.70 x10(3) uL 0.00   Basophils Absolute      0.00 - 0.20 x10(3) uL 0.03   Immature Granulocyte Absolute      0.00 - 1.00 x10(3) uL 0.07   Neutrophils %      % 73.9   Lymphocytes %      % 17.4   Monocytes %      % 7.8   Eosinophils %      % 0.0   Basophils %      % 0.3   Immature Granulocyte %      % 0.6   CREATININE      0.70 - 1.30 mg/dL 0.76   eGFR NON-AFR.  AMERICAN      >=60 106   eGFR       >=60 122   SED RATE      0 - 20 mm/Hr 60 (H)   C-REACTIVE PROTEIN      <0.30 mg/dL 12.20 (H)   AST (SGOT)      15 - 37 U/L 19   ALT (SGPT)      16 - 61 U/L 32   Albumin      3.4 - 5.0 g/dL 3.1 (L)

## 2023-01-13 ENCOUNTER — TELEPHONE (OUTPATIENT)
Dept: RHEUMATOLOGY | Facility: CLINIC | Age: 53
End: 2023-01-13

## 2023-01-13 NOTE — TELEPHONE ENCOUNTER
Patient is calling to advise he scheduled first available Saturday follow up appointment with Dr. Zulay Iverson on 2/4/23. Patient is asking if Dr. Marcelo Montero is able to prescribe the 10 little pills, patient does not know the name, that he can also use incase the arthritis gets bad. Patient is requesting a call back to confirm. Please advise.

## 2023-01-13 NOTE — TELEPHONE ENCOUNTER
I gave him a 30-day supply in December 2022 and advised to follow-up. He still has not followed up. Also needs to get blood work done to monitor him on sulfasalazine.   He needs to follow-up now

## 2023-01-13 NOTE — TELEPHONE ENCOUNTER
LOV: 2/9/2022 hold refill until pt follows up or labs completed? Future Appointments   Date Time Provider Sean Rubalcava   2/4/2023 10:40 AM Sukhjinder Chang MD 2014 [facility]     Labs:   Component      Latest Ref Rng & Units 2/1/2022   WBC      4.0 - 11.0 x10(3) uL 12.0 (H)   RBC      4.30 - 5.70 x10(6)uL 5.74 (H)   Hemoglobin      13.0 - 17.5 g/dL 15.0   Hematocrit      39.0 - 53.0 % 46.0   MCV      80.0 - 100.0 fL 80.1   MCH      26.0 - 34.0 pg 26.1   MCHC      31.0 - 37.0 g/dL 32.6   RDW-SD      35.1 - 46.3 fL 39.8   RDW      11.0 - 15.0 % 13.6   Platelet Count      651.8 - 450.0 10(3)uL 331.0   Prelim Neutrophil Abs      1.50 - 7.70 x10 (3) uL 8.85 (H)   Neutrophils Absolute      1.50 - 7.70 x10(3) uL 8.85 (H)   Lymphocytes Absolute      1.00 - 4.00 x10(3) uL 2.08   Monocytes Absolute      0.10 - 1.00 x10(3) uL 0.93   Eosinophils Absolute      0.00 - 0.70 x10(3) uL 0.00   Basophils Absolute      0.00 - 0.20 x10(3) uL 0.03   Immature Granulocyte Absolute      0.00 - 1.00 x10(3) uL 0.07   Neutrophils %      % 73.9   Lymphocytes %      % 17.4   Monocytes %      % 7.8   Eosinophils %      % 0.0   Basophils %      % 0.3   Immature Granulocyte %      % 0.6   CREATININE      0.70 - 1.30 mg/dL 0.76   eGFR NON-AFR.  AMERICAN      >=60 106   eGFR       >=60 122   SED RATE      0 - 20 mm/Hr 60 (H)   C-REACTIVE PROTEIN      <0.30 mg/dL 12.20 (H)   AST (SGOT)      15 - 37 U/L 19   ALT (SGPT)      16 - 61 U/L 32   Albumin      3.4 - 5.0 g/dL 3.1 (L)

## 2023-02-03 ENCOUNTER — LAB ENCOUNTER (OUTPATIENT)
Dept: LAB | Facility: HOSPITAL | Age: 53
End: 2023-02-03
Attending: INTERNAL MEDICINE
Payer: COMMERCIAL

## 2023-02-03 ENCOUNTER — TELEPHONE (OUTPATIENT)
Dept: RHEUMATOLOGY | Facility: CLINIC | Age: 53
End: 2023-02-03

## 2023-02-03 DIAGNOSIS — Z51.81 MEDICATION MONITORING ENCOUNTER: ICD-10-CM

## 2023-02-03 DIAGNOSIS — M06.9 RHEUMATOID ARTHRITIS INVOLVING MULTIPLE SITES, UNSPECIFIED WHETHER RHEUMATOID FACTOR PRESENT (HCC): ICD-10-CM

## 2023-02-03 DIAGNOSIS — M06.9 RHEUMATOID ARTHRITIS INVOLVING MULTIPLE SITES, UNSPECIFIED WHETHER RHEUMATOID FACTOR PRESENT (HCC): Primary | ICD-10-CM

## 2023-02-03 LAB
ALBUMIN SERPL-MCNC: 3.5 G/DL (ref 3.4–5)
ALT SERPL-CCNC: 26 U/L
AST SERPL-CCNC: 26 U/L (ref 15–37)
BASOPHILS # BLD AUTO: 0.03 X10(3) UL (ref 0–0.2)
BASOPHILS NFR BLD AUTO: 0.4 %
CREAT BLD-MCNC: 0.79 MG/DL
CRP SERPL-MCNC: 0.8 MG/DL (ref ?–0.3)
DEPRECATED RDW RBC AUTO: 41.9 FL (ref 35.1–46.3)
EOSINOPHIL # BLD AUTO: 0.15 X10(3) UL (ref 0–0.7)
EOSINOPHIL NFR BLD AUTO: 2 %
ERYTHROCYTE [DISTWIDTH] IN BLOOD BY AUTOMATED COUNT: 14.8 % (ref 11–15)
ERYTHROCYTE [SEDIMENTATION RATE] IN BLOOD: 48 MM/HR
GFR SERPLBLD BASED ON 1.73 SQ M-ARVRAT: 107 ML/MIN/1.73M2 (ref 60–?)
HCT VFR BLD AUTO: 45.5 %
HGB BLD-MCNC: 14.3 G/DL
IMM GRANULOCYTES # BLD AUTO: 0.02 X10(3) UL (ref 0–1)
IMM GRANULOCYTES NFR BLD: 0.3 %
LYMPHOCYTES # BLD AUTO: 3.34 X10(3) UL (ref 1–4)
LYMPHOCYTES NFR BLD AUTO: 44.2 %
MCH RBC QN AUTO: 24.4 PG (ref 26–34)
MCHC RBC AUTO-ENTMCNC: 31.4 G/DL (ref 31–37)
MCV RBC AUTO: 77.6 FL
MONOCYTES # BLD AUTO: 0.57 X10(3) UL (ref 0.1–1)
MONOCYTES NFR BLD AUTO: 7.5 %
NEUTROPHILS # BLD AUTO: 3.45 X10 (3) UL (ref 1.5–7.7)
NEUTROPHILS # BLD AUTO: 3.45 X10(3) UL (ref 1.5–7.7)
NEUTROPHILS NFR BLD AUTO: 45.6 %
PLATELET # BLD AUTO: 258 10(3)UL (ref 150–450)
RBC # BLD AUTO: 5.86 X10(6)UL
WBC # BLD AUTO: 7.6 X10(3) UL (ref 4–11)

## 2023-02-03 PROCEDURE — 82565 ASSAY OF CREATININE: CPT

## 2023-02-03 PROCEDURE — 85652 RBC SED RATE AUTOMATED: CPT

## 2023-02-03 PROCEDURE — 84460 ALANINE AMINO (ALT) (SGPT): CPT

## 2023-02-03 PROCEDURE — 36415 COLL VENOUS BLD VENIPUNCTURE: CPT

## 2023-02-03 PROCEDURE — 82040 ASSAY OF SERUM ALBUMIN: CPT

## 2023-02-03 PROCEDURE — 86140 C-REACTIVE PROTEIN: CPT

## 2023-02-03 PROCEDURE — 84450 TRANSFERASE (AST) (SGOT): CPT

## 2023-02-03 PROCEDURE — 85025 COMPLETE CBC W/AUTO DIFF WBC: CPT

## 2023-02-03 NOTE — TELEPHONE ENCOUNTER
Called and spoke with pt  Follow up scheduled for 02/02/2018 @ 10:40 with [employer] reminder mailed per pt    Lab requested standing labs reordered. Exp was set for 2023 but they were ordered in 2021. They cannot use after 1 year. Reordered lab panel for upcoming visit.      Future Appointments   Date Time Provider Sean Rubalcava   2/3/2023  8:45 AM [facility]   2/4/2023 10:40 AM Hattie Wyatt MD 2014 [facility]

## 2023-02-04 ENCOUNTER — LAB ENCOUNTER (OUTPATIENT)
Dept: LAB | Facility: HOSPITAL | Age: 53
End: 2023-02-04
Attending: INTERNAL MEDICINE
Payer: COMMERCIAL

## 2023-02-04 ENCOUNTER — OFFICE VISIT (OUTPATIENT)
Dept: RHEUMATOLOGY | Facility: CLINIC | Age: 53
End: 2023-02-04

## 2023-02-04 VITALS
HEART RATE: 94 BPM | DIASTOLIC BLOOD PRESSURE: 103 MMHG | BODY MASS INDEX: 34.71 KG/M2 | WEIGHT: 229 LBS | HEIGHT: 68 IN | SYSTOLIC BLOOD PRESSURE: 160 MMHG

## 2023-02-04 DIAGNOSIS — M06.9 RHEUMATOID ARTHRITIS INVOLVING MULTIPLE SITES, UNSPECIFIED WHETHER RHEUMATOID FACTOR PRESENT (HCC): ICD-10-CM

## 2023-02-04 DIAGNOSIS — M06.9 RHEUMATOID ARTHRITIS INVOLVING MULTIPLE SITES, UNSPECIFIED WHETHER RHEUMATOID FACTOR PRESENT (HCC): Primary | ICD-10-CM

## 2023-02-04 DIAGNOSIS — Z51.81 MEDICATION MONITORING ENCOUNTER: ICD-10-CM

## 2023-02-04 LAB
HBV CORE AB SERPL QL IA: NONREACTIVE
HBV SURFACE AB SER QL: NONREACTIVE
HBV SURFACE AB SERPL IA-ACNC: <3.1 MIU/ML
HBV SURFACE AG SER-ACNC: <0.1 [IU]/L
HBV SURFACE AG SERPL QL IA: NONREACTIVE
HCV AB SERPL QL IA: NONREACTIVE

## 2023-02-04 PROCEDURE — 87340 HEPATITIS B SURFACE AG IA: CPT | Performed by: INTERNAL MEDICINE

## 2023-02-04 PROCEDURE — 86480 TB TEST CELL IMMUN MEASURE: CPT

## 2023-02-04 PROCEDURE — 86803 HEPATITIS C AB TEST: CPT | Performed by: INTERNAL MEDICINE

## 2023-02-04 PROCEDURE — 86706 HEP B SURFACE ANTIBODY: CPT | Performed by: INTERNAL MEDICINE

## 2023-02-04 PROCEDURE — 3008F BODY MASS INDEX DOCD: CPT | Performed by: INTERNAL MEDICINE

## 2023-02-04 PROCEDURE — 3077F SYST BP >= 140 MM HG: CPT | Performed by: INTERNAL MEDICINE

## 2023-02-04 PROCEDURE — 82955 ASSAY OF G6PD ENZYME: CPT | Performed by: INTERNAL MEDICINE

## 2023-02-04 PROCEDURE — 36415 COLL VENOUS BLD VENIPUNCTURE: CPT

## 2023-02-04 PROCEDURE — 86704 HEP B CORE ANTIBODY TOTAL: CPT | Performed by: INTERNAL MEDICINE

## 2023-02-04 PROCEDURE — 3080F DIAST BP >= 90 MM HG: CPT | Performed by: INTERNAL MEDICINE

## 2023-02-04 PROCEDURE — 99214 OFFICE O/P EST MOD 30 MIN: CPT | Performed by: INTERNAL MEDICINE

## 2023-02-04 RX ORDER — SULFASALAZINE 500 MG/1
1500 TABLET ORAL 2 TIMES DAILY
Qty: 180 TABLET | Refills: 0 | Status: SHIPPED | OUTPATIENT
Start: 2023-02-04

## 2023-02-04 RX ORDER — PREDNISONE 10 MG/1
TABLET ORAL
Qty: 20 TABLET | Refills: 0 | Status: SHIPPED | OUTPATIENT
Start: 2023-02-04

## 2023-02-04 NOTE — PATIENT INSTRUCTIONS
You were seen today for rheumatoid arthritis  You continue to have some pain and swelling on and off  Continue sulfasalazine and Plaquenil  Make sure to see the eye doctors as you are on Plaquenil  Blood work sometime next week, evaluate for the Humira  Plan to get you approved for Humira  I also gave you some prednisone in case you flare  Plan to see me about 6 weeks after starting Humira

## 2023-02-06 LAB
GLUCOSE-6-PHOSPHATE DEHYDROGENASE: 14.6 U/G HB
M TB IFN-G CD4+ T-CELLS BLD-ACNC: 0.18 IU/ML
M TB TUBERC IFN-G BLD QL: POSITIVE
M TB TUBERC IGNF/MITOGEN IGNF CONTROL: >10 IU/ML
QFT TB1 AG MINUS NIL: 0.57 IU/ML
QFT TB2 AG MINUS NIL: 0.44 IU/ML

## 2023-02-08 ENCOUNTER — TELEPHONE (OUTPATIENT)
Dept: RHEUMATOLOGY | Facility: CLINIC | Age: 53
End: 2023-02-08

## 2023-02-08 DIAGNOSIS — R76.12 POSITIVE QUANTIFERON-TB GOLD TEST: Primary | ICD-10-CM

## 2023-02-09 ENCOUNTER — TELEPHONE (OUTPATIENT)
Dept: RHEUMATOLOGY | Facility: CLINIC | Age: 53
End: 2023-02-09

## 2023-02-09 NOTE — TELEPHONE ENCOUNTER
If you can let patient know that his Quanteferon TB was positive.   I ordered a chest x-ray, if he can get that done   Also referred him to infectious disease, [facility], if you can provide him with the information  We are going to have to hold off on Humira until he sees infectious disease

## 2023-02-09 NOTE — TELEPHONE ENCOUNTER
If we can get him approved for Humira every 2 weeks for his RA.       He was found to have positive Quant TB therefore will need to go on treatment for TB first.  I sent a separate message regarding this

## 2023-02-10 ENCOUNTER — TELEPHONE (OUTPATIENT)
Dept: RHEUMATOLOGY | Facility: CLINIC | Age: 53
End: 2023-02-10

## 2023-02-10 NOTE — TELEPHONE ENCOUNTER
Spoke to patient and relayed Dr. Yasemin Moulton message as shown below. Patient verbalized understanding and had no further questions at this time. ID contact information provided. Aline Loomis MD   7:41 PM  Note  If you can let patient know that his Quanteferon TB was positive.   I ordered a chest x-ray, if he can get that done   Also referred him to infectious disease, [facility], if you can provide him with the information  We are going to have to hold off on Humira until he sees infectious disease

## 2023-02-10 NOTE — TELEPHONE ENCOUNTER
Dr. Abhijit Long noted per your note Humira was to be held off until he see Infectious disease (patient already made aware as well as chest xray order). Did you still want PA to be submitted or wait until he is seen?

## 2023-02-10 NOTE — TELEPHONE ENCOUNTER
Patient calling to request more information about x-ray and lab results. Stated received a message on [facility], but is unsure about why x-ray is needed.

## 2023-02-11 ENCOUNTER — HOSPITAL ENCOUNTER (OUTPATIENT)
Dept: GENERAL RADIOLOGY | Facility: HOSPITAL | Age: 53
Discharge: HOME | End: 2023-02-11
Attending: INTERNAL MEDICINE
Payer: COMMERCIAL

## 2023-02-11 DIAGNOSIS — R76.12 POSITIVE QUANTIFERON-TB GOLD TEST: ICD-10-CM

## 2023-02-11 PROCEDURE — 71046 X-RAY EXAM CHEST 2 VIEWS: CPT | Performed by: INTERNAL MEDICINE

## 2023-02-14 NOTE — TELEPHONE ENCOUNTER
Do you want to complete after pt follows up with ID? Component      Latest Ref Rng & Units 2/4/2023   Quantiferon TB NIL      IU/mL 0.18   Quantiferon-TB1 Minus NIL      IU/mL 0.57   Quantiferon-TB2 Minus NIL      IU/mL 0.44   Quantiferon TB Mitogen minus NIL      IU/mL >10.00   QTB. RESULT      Negative Positive (A)   HEPATITIS B SURFACE AB QUAL      Nonreactive  Nonreactive   HEPATITIS B SURFACE AB QUANT      mIU/mL <3.10   HBSAg Screen      Nonreactive  Nonreactive   Hbsag Screen Index       <0.10   HEPATITIS B CORE AB, TOTAL      Nonreactive  Nonreactive   GLUCOSE-6-PHOSPHATE DEHYDROGEN      9.9 - 16.6 U/g Hb 14.6   HCV AB      Nonreactive  Nonreactive

## 2023-03-02 DIAGNOSIS — Z22.7 LATENT TUBERCULOSIS: Primary | ICD-10-CM

## 2023-03-04 DIAGNOSIS — M06.9 RHEUMATOID ARTHRITIS INVOLVING MULTIPLE SITES, UNSPECIFIED WHETHER RHEUMATOID FACTOR PRESENT (HCC): ICD-10-CM

## 2023-03-04 RX ORDER — SULFASALAZINE 500 MG/1
1500 TABLET ORAL 2 TIMES DAILY
Qty: 180 TABLET | Refills: 0 | Status: SHIPPED | OUTPATIENT
Start: 2023-03-04

## 2023-03-04 NOTE — TELEPHONE ENCOUNTER
Disp Refills Start End    sulfaSALAzine 500 MG Oral Tab 180 tablet 0 2/4/2023       LOV: 2/4/23  No future appointments. Labs:    Instructions    You were seen today for rheumatoid arthritis  You continue to have some pain and swelling on and off  Continue sulfasalazine and Plaquenil  Make sure to see the eye doctors as you are on Plaquenil  Blood work sometime next week, evaluate for the Humira  Plan to get you approved for Humira  I also gave you some prednisone in case you flare  Plan to see me about 6 weeks after starting Humira

## 2023-03-21 RX ORDER — HYDROXYCHLOROQUINE SULFATE 200 MG/1
400 TABLET, FILM COATED ORAL DAILY
Qty: 180 TABLET | Refills: 0 | Status: SHIPPED | OUTPATIENT
Start: 2023-03-21

## 2023-03-21 NOTE — TELEPHONE ENCOUNTER
Disp Refills Start End    hydroxychloroquine 200 MG Oral Tab 180 tablet 0 8/15/2022      LOV: 2/4/23  No future appointments.   Labs:  Component      Latest Ref Rng & Units 2/3/2023   WBC      4.0 - 11.0 x10(3) uL 7.6   RBC      4.30 - 5.70 x10(6)uL 5.86 (H)   Hemoglobin      13.0 - 17.5 g/dL 14.3   Hematocrit      39.0 - 53.0 % 45.5   MCV      80.0 - 100.0 fL 77.6 (L)   MCH      26.0 - 34.0 pg 24.4 (L)   MCHC      31.0 - 37.0 g/dL 31.4   RDW-SD      35.1 - 46.3 fL 41.9   RDW      11.0 - 15.0 % 14.8   Platelet Count      131.3 - 450.0 10(3)uL 258.0   Prelim Neutrophil Abs      1.50 - 7.70 x10 (3) uL 3.45   Neutrophils Absolute      1.50 - 7.70 x10(3) uL 3.45   Lymphocytes Absolute      1.00 - 4.00 x10(3) uL 3.34   Monocytes Absolute      0.10 - 1.00 x10(3) uL 0.57   Eosinophils Absolute      0.00 - 0.70 x10(3) uL 0.15   Basophils Absolute      0.00 - 0.20 x10(3) uL 0.03   Immature Granulocyte Absolute      0.00 - 1.00 x10(3) uL 0.02   Neutrophils %      % 45.6   Lymphocytes %      % 44.2   Monocytes %      % 7.5   Eosinophils %      % 2.0   Basophils %      % 0.4   Immature Granulocyte %      % 0.3   CREATININE      0.70 - 1.30 mg/dL 0.79   eGFR-Cr      >=60 mL/min/1.73m2 107   Albumin      3.4 - 5.0 g/dL 3.5   ALT (SGPT)      16 - 61 U/L 26   AST (SGOT)      15 - 37 U/L 26   C-REACTIVE PROTEIN      <0.30 mg/dL 0.80 (H)   SED RATE      0 - 20 mm/Hr 48 (H)      Instructions    You were seen today for rheumatoid arthritis  You continue to have some pain and swelling on and off  Continue sulfasalazine and Plaquenil  Make sure to see the eye doctors as you are on Plaquenil  Blood work sometime next week, evaluate for the Humira  Plan to get you approved for Humira  I also gave you some prednisone in case you flare  Plan to see me about 6 weeks after starting Humira

## 2023-03-30 ENCOUNTER — TELEPHONE (OUTPATIENT)
Dept: RHEUMATOLOGY | Facility: CLINIC | Age: 53
End: 2023-03-30

## 2023-03-30 NOTE — TELEPHONE ENCOUNTER
Patient was seen by infectious disease on 3/2/2023, he was seen for latent TB and a positive quant TB. He will be starting rifampin 600 mg daily for 4 months.

## 2023-04-05 DIAGNOSIS — M06.9 RHEUMATOID ARTHRITIS INVOLVING MULTIPLE SITES, UNSPECIFIED WHETHER RHEUMATOID FACTOR PRESENT (HCC): ICD-10-CM

## 2023-04-05 RX ORDER — SULFASALAZINE 500 MG/1
1500 TABLET ORAL 2 TIMES DAILY
Qty: 180 TABLET | Refills: 0 | Status: SHIPPED | OUTPATIENT
Start: 2023-04-05

## 2023-04-05 NOTE — TELEPHONE ENCOUNTER
LOV: 02/04/2023  No future appointments. LABS:   Component      Latest Ref Rng 2/3/2023 2/4/2023   WBC      4.0 - 11.0 x10(3) uL 7.6     RBC      4.30 - 5.70 x10(6)uL 5.86 (H)     Hemoglobin      13.0 - 17.5 g/dL 14.3     Hematocrit      39.0 - 53.0 % 45.5     MCV      80.0 - 100.0 fL 77.6 (L)     MCH      26.0 - 34.0 pg 24.4 (L)     MCHC      31.0 - 37.0 g/dL 31.4     RDW-SD      35.1 - 46.3 fL 41.9     RDW      11.0 - 15.0 % 14.8     Platelet Count      361.9 - 450.0 10(3)uL 258.0     Prelim Neutrophil Abs      1.50 - 7.70 x10 (3) uL 3.45     Neutrophils Absolute      1.50 - 7.70 x10(3) uL 3.45     Lymphocytes Absolute      1.00 - 4.00 x10(3) uL 3.34     Monocytes Absolute      0.10 - 1.00 x10(3) uL 0.57     Eosinophils Absolute      0.00 - 0.70 x10(3) uL 0.15     Basophils Absolute      0.00 - 0.20 x10(3) uL 0.03     Immature Granulocyte Absolute      0.00 - 1.00 x10(3) uL 0.02     Neutrophils %      % 45.6     Lymphocytes %      % 44.2     Monocytes %      % 7.5     Eosinophils %      % 2.0     Basophils %      % 0.4     Immature Granulocyte %      % 0.3     Quantiferon TB NIL      IU/mL  0.18    Quantiferon-TB1 Minus NIL      IU/mL  0.57    Quantiferon-TB2 Minus NIL      IU/mL  0.44    Quantiferon TB Mitogen minus NIL      IU/mL  >10.00    Quantiferon TB Result      Negative   Positive !     CREATININE      0.70 - 1.30 mg/dL 0.79     eGFR-Cr      >=60 mL/min/1.73m2 107     HEPATITIS B SURFACE AB QUAL      Nonreactive    Nonreactive    HEPATITIS B SURFACE AB QUANT      mIU/mL  <3.10    HBSAg Screen      Nonreactive    Nonreactive    Hbsag Screen Index  <0.10    Albumin      3.4 - 5.0 g/dL 3.5     ALT (SGPT)      16 - 61 U/L 26     AST (SGOT)      15 - 37 U/L 26     C-REACTIVE PROTEIN      <0.30 mg/dL 0.80 (H)     SED RATE      0 - 20 mm/Hr 48 (H)     HEPATITIS B CORE AB, TOTAL      Nonreactive    Nonreactive    GLUCOSE-6-PHOSPHATE DEHYDROGEN      9.9 - 16.6 U/g Hb  14.6    HCV AB      Nonreactive    Nonreactive Legend:  (H) High  (L) Low  !  Abnormal

## 2023-04-12 ENCOUNTER — APPOINTMENT (OUTPATIENT)
Dept: OTHER | Facility: HOSPITAL | Age: 53
End: 2023-04-12
Attending: EMERGENCY MEDICINE

## 2023-04-12 ENCOUNTER — TELEPHONE (OUTPATIENT)
Dept: PULMONOLOGY | Facility: CLINIC | Age: 53
End: 2023-04-12

## 2023-04-12 NOTE — TELEPHONE ENCOUNTER
Kush LAWRENCE to add CONSULT for IGGY/DOT clearance. Incoming call from Dr. Brad Giron to add within 1 month.

## 2023-04-12 NOTE — TELEPHONE ENCOUNTER
Incoming call from Dr. Francisco Guerrero to add patient for CONSULT sometime in the next month-IGGY needs DOT clearance. Principal Discharge DX:	Sprain Principal Discharge DX:	Sprain  Secondary Diagnosis:	Hip pain  Secondary Diagnosis:	Arthritis

## 2023-04-13 NOTE — TELEPHONE ENCOUNTER
Patient accepted appointment 4/18/23 for IGGY/DOT clearance 4/18/23 at 1pm. Verified appointment details.

## 2023-04-19 ENCOUNTER — TELEPHONE (OUTPATIENT)
Dept: RHEUMATOLOGY | Facility: CLINIC | Age: 53
End: 2023-04-19

## 2023-04-21 ENCOUNTER — LAB ENCOUNTER (OUTPATIENT)
Dept: LAB | Facility: HOSPITAL | Age: 53
End: 2023-04-21
Attending: INTERNAL MEDICINE
Payer: COMMERCIAL

## 2023-04-21 DIAGNOSIS — Z22.7 LATENT TUBERCULOSIS: ICD-10-CM

## 2023-04-21 PROCEDURE — 87389 HIV-1 AG W/HIV-1&-2 AB AG IA: CPT

## 2023-04-21 PROCEDURE — 36415 COLL VENOUS BLD VENIPUNCTURE: CPT

## 2023-04-24 ENCOUNTER — PATIENT MESSAGE (OUTPATIENT)
Dept: INTERNAL MEDICINE CLINIC | Facility: CLINIC | Age: 53
End: 2023-04-24

## 2023-05-05 DIAGNOSIS — M06.9 RHEUMATOID ARTHRITIS INVOLVING MULTIPLE SITES, UNSPECIFIED WHETHER RHEUMATOID FACTOR PRESENT (HCC): ICD-10-CM

## 2023-05-05 RX ORDER — SULFASALAZINE 500 MG/1
1500 TABLET ORAL 2 TIMES DAILY
Qty: 180 TABLET | Refills: 0 | Status: SHIPPED | OUTPATIENT
Start: 2023-05-05

## 2023-06-14 DIAGNOSIS — M06.9 RHEUMATOID ARTHRITIS INVOLVING MULTIPLE SITES, UNSPECIFIED WHETHER RHEUMATOID FACTOR PRESENT (HCC): ICD-10-CM

## 2023-06-15 RX ORDER — SULFASALAZINE 500 MG/1
1500 TABLET ORAL 2 TIMES DAILY
Qty: 180 TABLET | Refills: 0 | Status: SHIPPED | OUTPATIENT
Start: 2023-06-15

## 2023-06-15 NOTE — TELEPHONE ENCOUNTER
LOV: 2/4/23  No future appointments.   Labs:   Component      Latest Ref Rng 2/3/2023   WBC      4.0 - 11.0 x10(3) uL 7.6    RBC      4.30 - 5.70 x10(6)uL 5.86 (H)    Hemoglobin      13.0 - 17.5 g/dL 14.3    Hematocrit      39.0 - 53.0 % 45.5    MCV      80.0 - 100.0 fL 77.6 (L)    MCH      26.0 - 34.0 pg 24.4 (L)    MCHC      31.0 - 37.0 g/dL 31.4    RDW-SD      35.1 - 46.3 fL 41.9    RDW      11.0 - 15.0 % 14.8    Platelet Count      847.4 - 450.0 10(3)uL 258.0    Prelim Neutrophil Abs      1.50 - 7.70 x10 (3) uL 3.45    Neutrophils Absolute      1.50 - 7.70 x10(3) uL 3.45    Lymphocytes Absolute      1.00 - 4.00 x10(3) uL 3.34    Monocytes Absolute      0.10 - 1.00 x10(3) uL 0.57    Eosinophils Absolute      0.00 - 0.70 x10(3) uL 0.15    Basophils Absolute      0.00 - 0.20 x10(3) uL 0.03    Immature Granulocyte Absolute      0.00 - 1.00 x10(3) uL 0.02    Neutrophils %      % 45.6    Lymphocytes %      % 44.2    Monocytes %      % 7.5    Eosinophils %      % 2.0    Basophils %      % 0.4    Immature Granulocyte %      % 0.3    CREATININE      0.70 - 1.30 mg/dL 0.79    eGFR-Cr      >=60 mL/min/1.73m2 107    Albumin      3.4 - 5.0 g/dL 3.5    ALT (SGPT)      16 - 61 U/L 26    AST (SGOT)      15 - 37 U/L 26    C-REACTIVE PROTEIN      <0.30 mg/dL 0.80 (H)    SED RATE      0 - 20 mm/Hr 48 (H)       Legend:  (H) High  (L) Low

## 2023-07-18 ENCOUNTER — TELEPHONE (OUTPATIENT)
Dept: RHEUMATOLOGY | Facility: CLINIC | Age: 53
End: 2023-07-18

## 2023-07-18 NOTE — TELEPHONE ENCOUNTER
Maybe he can schedule a follow-up to see if he is RA is still active, if so then I would start Humira.   If he can schedule a follow-up

## 2023-07-18 NOTE — TELEPHONE ENCOUNTER
KINJAL MARIN for [address] [place] [address] anticipated:    F/U: 8/4/23 scheduled. New Insurance: [employer], patient will upload new information asap. Dr Holman Patient: Maybe he can schedule a follow-up to see if he is RA is still active, if so then I would start Humira.   If he can schedule a follow-up

## 2023-07-25 NOTE — TELEPHONE ENCOUNTER
Will await follow up appointment for PA.       Future Appointments   Date Time Provider Sean Rubalcava   8/4/2023  3:00 PM Apolinar Dance, MD 2014 [facility]

## 2023-08-19 DIAGNOSIS — M06.9 RHEUMATOID ARTHRITIS INVOLVING MULTIPLE SITES, UNSPECIFIED WHETHER RHEUMATOID FACTOR PRESENT (HCC): ICD-10-CM

## 2023-08-19 NOTE — TELEPHONE ENCOUNTER
Current Outpatient Medications   Medication Sig Dispense Refill    sulfaSALAzine 500 MG Oral Tab Take 3 tablets (1,500 mg total) by mouth 2 (two) times daily.  180 tablet 0

## 2023-08-19 NOTE — TELEPHONE ENCOUNTER
Requested Prescriptions     Pending Prescriptions Disp Refills    sulfaSALAzine 500 MG Oral Tab 180 tablet 0     Sig: Take 3 tablets (1,500 mg total) by mouth 2 (two) times daily. LF: 6/15/23 #180 TAB W/ 0 RF  LOV: 2/4/23  Future Appointments   Date Time Provider Sean Rubalcava   8/23/2023  4:20 PM Nicole Dawson MD 2014 [facility]     Labs:   Component      Latest Ref Rng 2/3/2023 2/4/2023   WBC      4.0 - 11.0 x10(3) uL 7.6     RBC      4.30 - 5.70 x10(6)uL 5.86 (H)     Hemoglobin      13.0 - 17.5 g/dL 14.3     Hematocrit      39.0 - 53.0 % 45.5     MCV      80.0 - 100.0 fL 77.6 (L)     MCH      26.0 - 34.0 pg 24.4 (L)     MCHC      31.0 - 37.0 g/dL 31.4     RDW-SD      35.1 - 46.3 fL 41.9     RDW      11.0 - 15.0 % 14.8     Platelet Count      047.5 - 450.0 10(3)uL 258.0     Prelim Neutrophil Abs      1.50 - 7.70 x10 (3) uL 3.45     Neutrophils Absolute      1.50 - 7.70 x10(3) uL 3.45     Lymphocytes Absolute      1.00 - 4.00 x10(3) uL 3.34     Monocytes Absolute      0.10 - 1.00 x10(3) uL 0.57     Eosinophils Absolute      0.00 - 0.70 x10(3) uL 0.15     Basophils Absolute      0.00 - 0.20 x10(3) uL 0.03     Immature Granulocyte Absolute      0.00 - 1.00 x10(3) uL 0.02     Neutrophils %      % 45.6     Lymphocytes %      % 44.2     Monocytes %      % 7.5     Eosinophils %      % 2.0     Basophils %      % 0.4     Immature Granulocyte %      % 0.3     Quantiferon TB NIL      IU/mL  0.18    Quantiferon-TB1 Minus NIL      IU/mL  0.57    Quantiferon-TB2 Minus NIL      IU/mL  0.44    Quantiferon TB Mitogen minus NIL      IU/mL  >10.00    Quantiferon TB Result      Negative   Positive !     CREATININE      0.70 - 1.30 mg/dL 0.79     eGFR-Cr      >=60 mL/min/1.73m2 107     HEPATITIS B SURFACE AB QUAL      Nonreactive    Nonreactive    HEPATITIS B SURFACE AB QUANT      mIU/mL  <3.10    HBSAg Screen      Nonreactive    Nonreactive    Hbsag Screen Index  <0.10    Albumin      3.4 - 5.0 g/dL 3.5     ALT (SGPT) 16 - 61 U/L 26     AST (SGOT)      15 - 37 U/L 26     C-REACTIVE PROTEIN      <0.30 mg/dL 0.80 (H)     SED RATE      0 - 20 mm/Hr 48 (H)     HEPATITIS B CORE AB, TOTAL      Nonreactive    Nonreactive    GLUCOSE-6-PHOSPHATE DEHYDROGEN      9.9 - 16.6 U/g Hb  14.6    HCV AB      Nonreactive    Nonreactive       Legend:  (H) High  (L) Low  !  Abnormal  You were seen today for rheumatoid arthritis  You continue to have some pain and swelling on and off  Continue sulfasalazine and Plaquenil  Make sure to see the eye doctors as you are on Plaquenil  Blood work sometime next week, evaluate for the Humira  Plan to get you approved for Humira  I also gave you some prednisone in case you flare  Plan to see me about 6 weeks after starting Humira

## 2023-08-20 RX ORDER — SULFASALAZINE 500 MG/1
1500 TABLET ORAL 2 TIMES DAILY
Qty: 180 TABLET | Refills: 0 | Status: SHIPPED | OUTPATIENT
Start: 2023-08-20

## 2023-09-26 DIAGNOSIS — M06.9 RHEUMATOID ARTHRITIS INVOLVING MULTIPLE SITES, UNSPECIFIED WHETHER RHEUMATOID FACTOR PRESENT (HCC): ICD-10-CM

## 2023-09-26 RX ORDER — SULFASALAZINE 500 MG/1
1500 TABLET ORAL 2 TIMES DAILY
Qty: 180 TABLET | Refills: 0 | Status: SHIPPED | OUTPATIENT
Start: 2023-09-26

## 2023-09-26 NOTE — TELEPHONE ENCOUNTER
LOV: 2/4/23  Last Refilled:#180, 0rfs 8/20/23  Labs:AST 26  ALT 26  2/3/23    Future Appointments   Date Time Provider Sean Rubalcava   10/4/2023  4:40 PM Rin Don MD 2014 [facility]     ASSESSMENT/PLAN:      Seropositive RA (+RF and CCP)  - Symptoms started around May with migratory joint pain and swelling  - On examination today there is some TTP in the right shoulder and mild swelling otherwise no swelling in the right ankle. Right ankle is nontender to palpation  - He did have COVID last month and his joint pain and swelling worsened afterwards  - Recommended to continue sulfasalazine 1000 mg BID, it was increased to 50 mg twice a day but he does not take it that way  - Also on Plaquenil 400 mg daily. He needs to see ophthalmology. I gave him referral  - Pt provided with a prednisone taper in case his joints worsen  - Continues to have a lot of joint pain that migrates. Discussed starting Biologics  - He was agreeable for Humira every 2 weeks. Need to get blood work first consisting of hepatitis B, C and TB. Risks/benefits of TNFi d/w patient which include: injection site reaction, risk of infection, risk of reactivation of TB and Hep B, malignancy especially lymphoma and skin cancer, drug-induced lupus, pancytopenia and psoriasis on palms and soles. TNFi are contraindicated in pts with demyelinating disease (MS) and CHF class III and IV.  - Not able to take methotrexate or leflunomide due to his fatty liver and fluctuating liver enzymes     L knee OA  - [facility], received gel injections  Left eye pain and redness ? Allergies  - He was seen by ophthalmology [phone], no evidence of uveitis or iritis  Transaminitis secondary to hepatic steatosis  - Following with GI  - LFTs now normal     Pt will f/u in 2-3 mos      Merlinda Fontana, MD  2/4/2023  10:45 AM                    Please advise.

## 2023-10-23 DIAGNOSIS — M06.9 RHEUMATOID ARTHRITIS INVOLVING MULTIPLE SITES, UNSPECIFIED WHETHER RHEUMATOID FACTOR PRESENT (HCC): ICD-10-CM

## 2023-10-23 RX ORDER — SULFASALAZINE 500 MG/1
1500 TABLET ORAL 2 TIMES DAILY
Qty: 180 TABLET | Refills: 0 | Status: SHIPPED | OUTPATIENT
Start: 2023-10-23

## 2023-10-23 RX ORDER — HYDROXYCHLOROQUINE SULFATE 200 MG/1
400 TABLET, FILM COATED ORAL DAILY
Qty: 180 TABLET | Refills: 0 | Status: SHIPPED | OUTPATIENT
Start: 2023-10-23

## 2023-10-23 NOTE — TELEPHONE ENCOUNTER
LOV: 2/4/23  Last Refilled:#180, 0rfrs 3/21/23    Future Appointments   Date Time Provider Sean Rubalcava   11/28/2023  4:20 PM Inez Vela MD 2014 [facility]     ASSESSMENT/PLAN:      Seropositive RA (+RF and CCP)  - Symptoms started around May with migratory joint pain and swelling  - On examination today there is some TTP in the right shoulder and mild swelling otherwise no swelling in the right ankle. Right ankle is nontender to palpation  - He did have COVID last month and his joint pain and swelling worsened afterwards  - Recommended to continue sulfasalazine 1000 mg BID, it was increased to 50 mg twice a day but he does not take it that way  - Also on Plaquenil 400 mg daily. He needs to see ophthalmology. I gave him referral  - Pt provided with a prednisone taper in case his joints worsen  - Continues to have a lot of joint pain that migrates. Discussed starting Biologics  - He was agreeable for Humira every 2 weeks. Need to get blood work first consisting of hepatitis B, C and TB. Risks/benefits of TNFi d/w patient which include: injection site reaction, risk of infection, risk of reactivation of TB and Hep B, malignancy especially lymphoma and skin cancer, drug-induced lupus, pancytopenia and psoriasis on palms and soles. TNFi are contraindicated in pts with demyelinating disease (MS) and CHF class III and IV.  - Not able to take methotrexate or leflunomide due to his fatty liver and fluctuating liver enzymes     L knee OA  - [facility], received gel injections  Left eye pain and redness ? Allergies  - He was seen by ophthalmology [phone], no evidence of uveitis or iritis  Transaminitis secondary to hepatic steatosis  - Following with GI  - LFTs now normal     Pt will f/u in 2-3 mos      Elda Tanner MD  2/4/2023  10:45 AM     Please advise.

## 2023-10-23 NOTE — TELEPHONE ENCOUNTER
LOV:  2/4/23  Future Appointments   Date Time Provider Sean Rocai   11/28/2023  4:20 PM Licha Carlos MD 2014 [facility]     Labs:    Component      Latest Ref Rng 2/3/2023 2/4/2023   WBC      4.0 - 11.0 x10(3) uL 7.6     RBC      4.30 - 5.70 x10(6)uL 5.86 (H)     Hemoglobin      13.0 - 17.5 g/dL 14.3     Hematocrit      39.0 - 53.0 % 45.5     MCV      80.0 - 100.0 fL 77.6 (L)     MCH      26.0 - 34.0 pg 24.4 (L)     MCHC      31.0 - 37.0 g/dL 31.4     RDW-SD      35.1 - 46.3 fL 41.9     RDW      11.0 - 15.0 % 14.8     Platelet Count      204.0 - 450.0 10(3)uL 258.0     Prelim Neutrophil Abs      1.50 - 7.70 x10 (3) uL 3.45     Neutrophils Absolute      1.50 - 7.70 x10(3) uL 3.45     Lymphocytes Absolute      1.00 - 4.00 x10(3) uL 3.34     Monocytes Absolute      0.10 - 1.00 x10(3) uL 0.57     Eosinophils Absolute      0.00 - 0.70 x10(3) uL 0.15     Basophils Absolute      0.00 - 0.20 x10(3) uL 0.03     Immature Granulocyte Absolute      0.00 - 1.00 x10(3) uL 0.02     Neutrophils %      % 45.6     Lymphocytes %      % 44.2     Monocytes %      % 7.5     Eosinophils %      % 2.0     Basophils %      % 0.4     Immature Granulocyte %      % 0.3     Quantiferon TB NIL      IU/mL  0.18    Quantiferon-TB1 Minus NIL      IU/mL  0.57    Quantiferon-TB2 Minus NIL      IU/mL  0.44    Quantiferon TB Mitogen minus NIL      IU/mL  >10.00    Quantiferon TB Result      Negative   Positive !     CREATININE      0.70 - 1.30 mg/dL 0.79     EGFR      >=60 mL/min/1.73m2 107     HEPATITIS B SURFACE AB QUAL      Nonreactive    Nonreactive    HEPATITIS B SURFACE AB QUANT      mIU/mL  <3.10    HBSAg Screen      Nonreactive    Nonreactive    Hbsag Screen Index  <0.10    Albumin      3.4 - 5.0 g/dL 3.5     ALT (SGPT)      16 - 61 U/L 26     AST (SGOT)      15 - 37 U/L 26     C-REACTIVE PROTEIN      <0.30 mg/dL 0.80 (H)     SED RATE      0 - 20 mm/Hr 48 (H)     HEPATITIS B CORE AB, TOTAL      Nonreactive    Nonreactive GLUCOSE-6-PHOSPHATE DEHYDROGEN      9.9 - 16.6 U/g Hb  14.6    HCV AB      Nonreactive    Nonreactive       Legend:  (H) High  (L) Low  !  Abnormal

## 2023-10-26 ENCOUNTER — TELEPHONE (OUTPATIENT)
Dept: RHEUMATOLOGY | Facility: CLINIC | Age: 53
End: 2023-10-26

## 2023-10-27 ENCOUNTER — TELEPHONE (OUTPATIENT)
Dept: OPHTHALMOLOGY | Facility: CLINIC | Age: 53
End: 2023-10-27

## 2023-10-27 RX ORDER — HYDROXYCHLOROQUINE SULFATE 200 MG/1
400 TABLET, FILM COATED ORAL DAILY
Qty: 180 TABLET | Refills: 0 | Status: SHIPPED | OUTPATIENT
Start: 2023-10-27

## 2023-10-27 NOTE — TELEPHONE ENCOUNTER
Script was sent 10/23/23 for #180 but pt stated provider cancelled script. Please advise.     LOV: 2/4/23   He was seen by ophthalmology [phone]   Future Appointments   Date Time Provider Sean Rubalcava   11/28/2023  4:20 PM Rin Don MD 2014 [facility]     Labs:    Component      Latest Ref Rng 2/3/2023   WBC      4.0 - 11.0 x10(3) uL 7.6    RBC      4.30 - 5.70 x10(6)uL 5.86 (H)    Hemoglobin      13.0 - 17.5 g/dL 14.3    Hematocrit      39.0 - 53.0 % 45.5    MCV      80.0 - 100.0 fL 77.6 (L)    MCH      26.0 - 34.0 pg 24.4 (L)    MCHC      31.0 - 37.0 g/dL 31.4    RDW-SD      35.1 - 46.3 fL 41.9    RDW      11.0 - 15.0 % 14.8    Platelet Count      263.5 - 450.0 10(3)uL 258.0    Prelim Neutrophil Abs      1.50 - 7.70 x10 (3) uL 3.45    Neutrophils Absolute      1.50 - 7.70 x10(3) uL 3.45    Lymphocytes Absolute      1.00 - 4.00 x10(3) uL 3.34    Monocytes Absolute      0.10 - 1.00 x10(3) uL 0.57    Eosinophils Absolute      0.00 - 0.70 x10(3) uL 0.15    Basophils Absolute      0.00 - 0.20 x10(3) uL 0.03    Immature Granulocyte Absolute      0.00 - 1.00 x10(3) uL 0.02    Neutrophils %      % 45.6    Lymphocytes %      % 44.2    Monocytes %      % 7.5    Eosinophils %      % 2.0    Basophils %      % 0.4    Immature Granulocyte %      % 0.3    CREATININE      0.70 - 1.30 mg/dL 0.79    EGFR      >=60 mL/min/1.73m2 107    Albumin      3.4 - 5.0 g/dL 3.5    ALT (SGPT)      16 - 61 U/L 26    AST (SGOT)      15 - 37 U/L 26    C-REACTIVE PROTEIN      <0.30 mg/dL 0.80 (H)    SED RATE      0 - 20 mm/Hr 48 (H)       Legend:  (H) High  (L) Low

## 2023-10-27 NOTE — TELEPHONE ENCOUNTER
Returned call; verified name and . Pt reports having seasonal allergies. This year/fall he has watery eyes for a month, now improving. Pt questioning if this is due to hydroxychloroquine. Informed pt watery eyes not a known side effect of the medication. Pt states he has made an appt with an ophthalmologist; suggested he discuss his watery eyes at the appt.

## 2023-10-27 NOTE — TELEPHONE ENCOUNTER
Left message script sent, schedule eye appointment, and reminder of follow up appointment with Dr. Nohemi Ro.

## 2023-10-27 NOTE — TELEPHONE ENCOUNTER
Patient called back. He asked to speak to Dr Vira Guy nurse again about his watery eyes. He asked for ophthalmology phone#, I gave it to him and transferred him to ophthalmology.    Call him at: [phone], okay to leave a voice message

## 2023-10-27 NOTE — TELEPHONE ENCOUNTER
Refilled hydroxychloroquine, he has been told multiple times to see the eye doctor, this will be his last refill until he sees the eye doctor.   Need to make sure there is no Plaquenil toxicity

## 2023-11-01 NOTE — TELEPHONE ENCOUNTER
Spoke to pt and pt wanted to schedule his Plaquenil eye exam with CRYSTAL.    Scheduled pt on 12/20/23 @ 4:30pm and told him it was going to be a dilated eye exam.

## 2023-11-28 ENCOUNTER — OFFICE VISIT (OUTPATIENT)
Dept: RHEUMATOLOGY | Facility: CLINIC | Age: 53
End: 2023-11-28

## 2023-11-28 VITALS
SYSTOLIC BLOOD PRESSURE: 190 MMHG | BODY MASS INDEX: 35 KG/M2 | HEIGHT: 68 IN | DIASTOLIC BLOOD PRESSURE: 120 MMHG | HEART RATE: 102 BPM

## 2023-11-28 DIAGNOSIS — M06.9 RHEUMATOID ARTHRITIS INVOLVING MULTIPLE SITES, UNSPECIFIED WHETHER RHEUMATOID FACTOR PRESENT (HCC): Primary | ICD-10-CM

## 2023-11-28 DIAGNOSIS — G51.0 BELL'S PALSY: ICD-10-CM

## 2023-11-28 DIAGNOSIS — Z51.81 MEDICATION MONITORING ENCOUNTER: ICD-10-CM

## 2023-11-28 PROCEDURE — 3077F SYST BP >= 140 MM HG: CPT | Performed by: INTERNAL MEDICINE

## 2023-11-28 PROCEDURE — 99215 OFFICE O/P EST HI 40 MIN: CPT | Performed by: INTERNAL MEDICINE

## 2023-11-28 PROCEDURE — 3080F DIAST BP >= 90 MM HG: CPT | Performed by: INTERNAL MEDICINE

## 2023-11-28 RX ORDER — PREDNISONE 10 MG/1
TABLET ORAL
Qty: 20 TABLET | Refills: 0 | Status: SHIPPED | OUTPATIENT
Start: 2023-11-28

## 2023-11-28 NOTE — PROGRESS NOTES
Cas Jameson is a 48year old male. HPI:     Chief Complaint   Patient presents with    Rheumatoid Arthritis       I had the pleasure of seeing Cas Jameson on 11/28/2023  for follow up Seropositive RA (+RF and CCP). Current Medications:  SSZ 1500 mg daily- started June 2021   mg daily- started April 2022  Blood work:  ESR 23, CRP 4.27 mg/dL  RF>600, CCP>340  +XANDER 1:320  AST 95, ALT 88  US liver: diffuse hepatic steatosis. Interval History: This is a 47 yo M with hx of L ACT and meniscus repair (>15 year ago), Fe deficiency anemia (new) presents with polyarthralgias. About 2 months ago he started to notice migratory joint pain. Pain will migrate to different joints which include ankles, wrists, MCPs, elbows even shoulders. She would also note some swelling of those joints and there would last about 2 days. At 1 point was unable to walk due to his ankle pain and swelling. Also at 1 point he was not able to raise the shoulder due to the pain and swelling. He would take 2 arthritis Aleve when he had the symptoms. Prior to this he was active, working out and playing basketball. He is not able to do this due to the pain in his joints. Blood work did show a positive XANDER and + CCP. Denies any family history of autoimmune disease. He was also found to have elevated liver enzymes. He will be having an ultrasound the liver in the next month  He also states that his eyes swell at times, the start about 3 months ago. He would notice some redness in his eyes also. Denies any blurry vision or vision changes. 9/8/2021:  Patient presents for follow-up of seropositive RA (+ RF and CCP). Also has transaminitis due to hepatic steatosis   He has been on sulfasalazine 1000 mg twice a day for the past 3 months now. Initially he continued to have migratory joint pain with swelling but over the past 4 weeks his joint pain has now resolved. Denies any joint pain or swelling.   He feels back to his normal self  Reports some left medial knee pain, saw a specialist at the joint [place] and they noted he had osteoarthritis and was recommending gel injections  Tolerating sulfasalazine, never went up to 1500 mg twice a day    11/10/2021  Patient presents for follow-up of seropositive RA (+ RF and CCP). Also has transaminitis due to hepatic steatosis   Decreased SSZ, now on 1000 mg in morning and 500 mg at night  Has been on this regimen for the past 3 weeks  Continues to have R knee pain, had gel injection about 2 mos ago, 5 weeks treatment. Suppose to go back in December for cortisone injection. Gel injection did not work  Minimal joint pain and swelling   He is more active now, able to work out and play basketball    2/1/2022-   Pt. Called today b/c was in bad pain - still had a lot of pain even after taking prednisone - high dose - still had left ankle pain - not able to bear weight - using crutches. He had a bad flare up 2 weeks post covid - polyarituclar flarea -   He has started a prednisone taper - 40mg x 3 days, then 30mg x 3 dasy - and just started 20mg today   his right 4th finger. Left wrist, left knee, right hamstring are all mild now - but were very painful. He hasn't been to work in 4 days. He is still finsihing the steroids. He increased ssz - x 2 weeks no   He was only taking 2 in the am and 1 pm - he never increased it to 6 - so he increase for the last 2 weeks to 7 tablets a day -   He hasn't had a bad flare up like this since June. He is takind 4 and 3     2/9/2022:  Presents for f/u of Seropositive RA  He remains on sulfasalazine 1500 mg twice a day  R ankle injected last week but continues to have pain in the R ankle. Last week he did have a lot of swelling in his right ankle. Now there is no swelling. Also reports some right shoulder pain.   It has improved since yesterday  Denies any other joint pain or swelling  Blood work last week did show significantly elevated ESR and CRP  He feels that his joint pain and swelling got worse after getting Covid last month    2/4/2023:  Presents for f/u of Seropositive RA  He remains on sulfasalazine 1500 mg twice a day  Last seen in February 2022  Recent blood work shows normal kidney and liver test.  Slightly elevated ESR 48  Continues to have pain on and off. Can be in hands, shoulders and knees  Has not been able to work out due to the pain  Also was getting swelling in the MCPs  Lost job and insurance and so couldn't see me for about 1 year   On Monday had pain in r shoulder and elbow and significant but then got better    11/28/2023:  Presents for f/u of Seropositive RA  He remains on sulfasalazine 1500 mg tdaily and  mg daily   Plan was to get approved for Humira but changed insurances and also had indeterminate TB test  Recently had some increased watery eye and then hard to move right side of the face and mouth. Happened a month ago and still has symptoms, cannot close right eye. Denies any recent infection or virus or fevers  Joints overall are stable, no swelling. No rashes             HISTORY:  Past Medical History:   Diagnosis Date    PONV (postoperative nausea and vomiting)     nausea      Social Hx Reviewed   Family Hx Reviewed     Medications (Active prior to today's visit):  Current Outpatient Medications   Medication Sig Dispense Refill    hydroxychloroquine 200 MG Oral Tab Take 2 tablets (400 mg total) by mouth daily. 180 tablet 0    sulfaSALAzine 500 MG Oral Tab Take 3 tablets (1,500 mg total) by mouth 2 (two) times daily.  540 tablet 0    predniSONE 10 MG Oral Tab 30 mg daily x3 days then 20 mg daily x3 days then 10 mg daily x3 days then stop 20 tablet 0    DICLOFENAC 1 % External Gel APPLY 4 G TOPICALLY 4 TIMES DAILY 500 g 0    predniSONE 10 MG Oral Tab Take 6 tabsx 1 day, take 5 tabsx 1 day, take 4 tabsx 1 day,take 3 tabsx 1 day, take 2 tabsx 1 day, take 1 tabsx 1 day, then off (Patient not taking: Reported on 2/9/2022) 21 tablet 0     .cmed  Allergies: Allergies   Allergen Reactions    Seasonal OTHER (SEE COMMENTS)     Runny Nose, watery eyes         ROS:   All other ROS are negative. PHYSICAL EXAM:   GEN: AAOx3, NAD  HEENT: EOMI, PERRLA, L eye redness  CVS: RRR, no murmurs rubs or gallops. Equal 2+ distal pulses. LUNGS: CTAB, no increased work of breathing  ABDOMEN:  soft NT/ND, +BS, no HSM  SKIN: No rashes or skin lesions. No nail findings  MSK:  Cervical spine: FROM  Hands: no synovitis in DIP, PIP and MCP, strong full fists  Wrist: no swelling, FROM  Elbow: FROM, no pain or swelling or warmth on palpation  Shoulders: FROM, no pain or swelling or warmth on palpation  Hip: normal log roll, no lateral hip pain, CORA test negative b/l  Knees: FROM, no warmth or effusion present. No pain with ROM. Ankles: Left ankle no swelling, nonTTP  Feet: no pain with MTP squeeze, no toe swelling or pain or warmth on palpation with FROM  Spine: no lumbar or sacral pain on palpation. NEURO: R sided Bell's palsy  PSYCH: normal mood       LABS:     Component      Latest Ref Rng & Units 6/16/2021 6/7/2021   Quantiferon TB NIL      IU/mL 0.02    Quantiferon-TB1 Minus NIL      IU/mL 0.06    Quantiferon-TB2 Minus NIL      IU/mL 0.01    Quantiferon TB Mitogen minus NIL      IU/mL >10.00    QTB. RESULT      Negative Negative    XANDER Titer/Pattern      <80  320 (A)   Reviewed By:        Carmencita Severance, M.D.   HBSAg Screen      Nonreactive  Nonreactive    Hbsag Screen Index       [id number]    HEPATITIS B SURFACE AB QUAL      Nonreactive  Nonreactive    HEPATITIS B SURFACE AB QUANT      mIU/mL <3.10    URIC ACID      3.5 - 7.2 mg/dL  5.3   SED RATE      0 - 20 mm/Hr  23 (H)   XANDER SCREEN      Negative  Positive (A)   C-Citrullinated Peptide IgG AB      0.0 - 6.9 U/mL  >340.0 (H)   C-REACTIVE PROTEIN      <0.30 mg/dL 4.27 (H)    RHEUMATOID FACTOR      <15 IU/mL >600 (H)    HEPATITIS B CORE AB, TOTAL      Nonreactive  Nonreactive    GLUCOSE-6-PHOSPHATE DEHYDROGEN      9.9 - 16.6 U/g Hb 16.1    HCV AB      Nonreactive  Nonreactive      Imaging:     US liver:  CONCLUSION:   1. Findings suggestive of diffuse hepatic steatosis. 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 He wants to hold off on Humira right now as joints are stable  - Blood work this week  - He will be seeing the ophthalmologist in December to evaluate for Plaquenil toxicity  - Not able to take methotrexate or leflunomide due to his fatty liver and fluctuating liver enzymes    Right sided Bell's palsy  - Symptoms started about a month ago. Denies any recent infection or a tick bite  - Plan to obtain further blood work. I gave patient a prednisone taper over 9 days. - Also recommended MRI of the brain but he did not want that at this time  - I will send a message to his PCP    L knee OA  - [facility], received gel injections  Left eye pain and redness ?  Allergies  - He was seen by ophthalmology [phone] [id number], no evidence of uveitis or iritis  Transaminitis secondary to hepatic steatosis  - Following with GI  - LFTs now normal    Spent 40 minutes obtaining history, evaluating patient, reviewing labs, discussing treatment options and completing documentation    Pt will f/u in 3-4 mos     Semaj Orellana MD  11/28/2023  4:20 PM

## 2023-11-28 NOTE — PATIENT INSTRUCTIONS
Seen today for rheumatoid arthritis  Joints are stable  Continue sulfasalazine and hydroxychloroquine  You also have symptoms of Bell's palsy on your right side  Will have you get some blood work  Take prednisone 30 mg daily for 3 days then 20 mg daily for 3 days then 10 mg daily for 3 days and then stop

## 2024-01-12 ENCOUNTER — TELEPHONE (OUTPATIENT)
Dept: RHEUMATOLOGY | Facility: CLINIC | Age: 54
End: 2024-01-12

## 2024-01-12 RX ORDER — PREDNISONE 10 MG/1
TABLET ORAL
Qty: 20 TABLET | Refills: 0 | Status: SHIPPED | OUTPATIENT
Start: 2024-01-12

## 2024-01-12 NOTE — TELEPHONE ENCOUNTER
Returned pt call; verified name and . Pt states for the last couple of days his shoulders, back of knees and ankles have been inflamed and painful. Has used Advil PRN. Current pain is 8/10. Pt asking for Prednisone for flare-up.

## 2024-01-12 NOTE — TELEPHONE ENCOUNTER
Patient is requesting the medication for inflammation and mentions it is 10 mg but does not know the name.   Patient does not have any remaining.   Please advise patient when confirmed.

## 2024-03-12 ENCOUNTER — LAB ENCOUNTER (OUTPATIENT)
Dept: LAB | Facility: HOSPITAL | Age: 54
End: 2024-03-12
Attending: INTERNAL MEDICINE
Payer: COMMERCIAL

## 2024-03-12 ENCOUNTER — TELEPHONE (OUTPATIENT)
Dept: RHEUMATOLOGY | Facility: CLINIC | Age: 54
End: 2024-03-12

## 2024-03-12 ENCOUNTER — OFFICE VISIT (OUTPATIENT)
Dept: RHEUMATOLOGY | Facility: CLINIC | Age: 54
End: 2024-03-12

## 2024-03-12 VITALS
HEIGHT: 68 IN | BODY MASS INDEX: 35 KG/M2 | HEART RATE: 97 BPM | DIASTOLIC BLOOD PRESSURE: 90 MMHG | SYSTOLIC BLOOD PRESSURE: 146 MMHG

## 2024-03-12 DIAGNOSIS — Z51.81 MEDICATION MONITORING ENCOUNTER: ICD-10-CM

## 2024-03-12 DIAGNOSIS — M06.9 RHEUMATOID ARTHRITIS INVOLVING MULTIPLE SITES, UNSPECIFIED WHETHER RHEUMATOID FACTOR PRESENT (HCC): Primary | ICD-10-CM

## 2024-03-12 DIAGNOSIS — M06.9 RHEUMATOID ARTHRITIS INVOLVING MULTIPLE SITES, UNSPECIFIED WHETHER RHEUMATOID FACTOR PRESENT (HCC): ICD-10-CM

## 2024-03-12 DIAGNOSIS — G51.0 BELL'S PALSY: ICD-10-CM

## 2024-03-12 LAB
ALBUMIN SERPL-MCNC: 4.4 G/DL (ref 3.2–4.8)
ALT SERPL-CCNC: 28 U/L
AST SERPL-CCNC: 32 U/L (ref ?–34)
BASOPHILS # BLD AUTO: 0.03 X10(3) UL (ref 0–0.2)
BASOPHILS NFR BLD AUTO: 0.4 %
CREAT BLD-MCNC: 0.8 MG/DL
CRP SERPL-MCNC: 1 MG/DL (ref ?–1)
DEPRECATED RDW RBC AUTO: 42.2 FL (ref 35.1–46.3)
EGFRCR SERPLBLD CKD-EPI 2021: 106 ML/MIN/1.73M2 (ref 60–?)
EOSINOPHIL # BLD AUTO: 0.26 X10(3) UL (ref 0–0.7)
EOSINOPHIL NFR BLD AUTO: 3.8 %
ERYTHROCYTE [DISTWIDTH] IN BLOOD BY AUTOMATED COUNT: 16.1 % (ref 11–15)
ERYTHROCYTE [SEDIMENTATION RATE] IN BLOOD: 97 MM/HR
HCT VFR BLD AUTO: 45.3 %
HGB BLD-MCNC: 14.4 G/DL
IMM GRANULOCYTES # BLD AUTO: 0.02 X10(3) UL (ref 0–1)
IMM GRANULOCYTES NFR BLD: 0.3 %
LYMPHOCYTES # BLD AUTO: 2.99 X10(3) UL (ref 1–4)
LYMPHOCYTES NFR BLD AUTO: 43.2 %
MCH RBC QN AUTO: 23.6 PG (ref 26–34)
MCHC RBC AUTO-ENTMCNC: 31.8 G/DL (ref 31–37)
MCV RBC AUTO: 74.3 FL
MONOCYTES # BLD AUTO: 0.55 X10(3) UL (ref 0.1–1)
MONOCYTES NFR BLD AUTO: 7.9 %
NEUTROPHILS # BLD AUTO: 3.07 X10 (3) UL (ref 1.5–7.7)
NEUTROPHILS # BLD AUTO: 3.07 X10(3) UL (ref 1.5–7.7)
NEUTROPHILS NFR BLD AUTO: 44.4 %
PLATELET # BLD AUTO: 293 10(3)UL (ref 150–450)
RBC # BLD AUTO: 6.1 X10(6)UL
WBC # BLD AUTO: 6.9 X10(3) UL (ref 4–11)

## 2024-03-12 PROCEDURE — 85652 RBC SED RATE AUTOMATED: CPT | Performed by: INTERNAL MEDICINE

## 2024-03-12 PROCEDURE — 36415 COLL VENOUS BLD VENIPUNCTURE: CPT

## 2024-03-12 PROCEDURE — 99214 OFFICE O/P EST MOD 30 MIN: CPT | Performed by: INTERNAL MEDICINE

## 2024-03-12 PROCEDURE — 82164 ANGIOTENSIN I ENZYME TEST: CPT

## 2024-03-12 PROCEDURE — 86225 DNA ANTIBODY NATIVE: CPT | Performed by: INTERNAL MEDICINE

## 2024-03-12 PROCEDURE — 85060 BLOOD SMEAR INTERPRETATION: CPT | Performed by: INTERNAL MEDICINE

## 2024-03-12 PROCEDURE — 84450 TRANSFERASE (AST) (SGOT): CPT

## 2024-03-12 PROCEDURE — 86038 ANTINUCLEAR ANTIBODIES: CPT | Performed by: INTERNAL MEDICINE

## 2024-03-12 PROCEDURE — 86039 ANTINUCLEAR ANTIBODIES (ANA): CPT | Performed by: INTERNAL MEDICINE

## 2024-03-12 PROCEDURE — 82565 ASSAY OF CREATININE: CPT

## 2024-03-12 PROCEDURE — 82040 ASSAY OF SERUM ALBUMIN: CPT

## 2024-03-12 PROCEDURE — 84460 ALANINE AMINO (ALT) (SGPT): CPT

## 2024-03-12 PROCEDURE — 86235 NUCLEAR ANTIGEN ANTIBODY: CPT | Performed by: INTERNAL MEDICINE

## 2024-03-12 PROCEDURE — 86140 C-REACTIVE PROTEIN: CPT | Performed by: INTERNAL MEDICINE

## 2024-03-12 PROCEDURE — 85025 COMPLETE CBC W/AUTO DIFF WBC: CPT | Performed by: INTERNAL MEDICINE

## 2024-03-12 RX ORDER — PREDNISONE 10 MG/1
TABLET ORAL
Qty: 20 TABLET | Refills: 1 | Status: SHIPPED | OUTPATIENT
Start: 2024-03-12

## 2024-03-12 RX ORDER — PREDNISONE 10 MG/1
TABLET ORAL
Qty: 20 TABLET | Refills: 0 | Status: SHIPPED | OUTPATIENT
Start: 2024-03-12 | End: 2024-03-12

## 2024-03-12 NOTE — TELEPHONE ENCOUNTER
Try calling 's office and spoke with Derek from the answering service who will put in a message to their office.Patient stated he had recent labs and  will like a copy.I have requested lab results to be faxed to our office and also I have requested labs via fax to [phone]

## 2024-03-12 NOTE — PATIENT INSTRUCTIONS
You were seen today for rheumatoid arthritis having worsening joint pain  For now continue sulfasalazine and hydroxychloroquine  You can get you approved for Humira  I gave you a prednisone taper over 9 days with a refill  Follow-up in the next 3 to 4 months

## 2024-03-12 NOTE — PROGRESS NOTES
Arie David is a 53 year old male.    HPI:     Chief Complaint   Patient presents with    Rheumatoid Arthritis    Follow - Up     Lab results discussion        I had the pleasure of seeing Arie David on 3/12/2024 for follow up Seropositive RA (+RF and CCP).     Current Medications:  SSZ 1000 mg BID- started June 2021   mg daily- started April 2022  Blood work:  ESR 23, CRP 4.27 mg/dL  RF>600, CCP>340  +XANDER 1:320  AST 95, ALT 88  US liver: diffuse hepatic steatosis.     Interval History:  This is a 52 yo M with hx of L ACT and meniscus repair (>15 year ago), Fe deficiency anemia (new) presents with polyarthralgias.  About 2 months ago he started to notice migratory joint pain.  Pain will migrate to different joints which include ankles, wrists, MCPs, elbows even shoulders.  She would also note some swelling of those joints and there would last about 2 days.  At 1 point was unable to walk due to his ankle pain and swelling.  Also at 1 point he was not able to raise the shoulder due to the pain and swelling.  He would take 2 arthritis Aleve when he had the symptoms.  Prior to this he was active, working out and playing basketball.  He is not able to do this due to the pain in his joints.  Blood work did show a positive XANDER and + CCP.  Denies any family history of autoimmune disease.  He was also found to have elevated liver enzymes.  He will be having an ultrasound the liver in the next month  He also states that his eyes swell at times, the start about 3 months ago.  He would notice some redness in his eyes also.  Denies any blurry vision or vision changes.    9/8/2021:  Patient presents for follow-up of seropositive RA (+ RF and CCP). Also has transaminitis due to hepatic steatosis   He has been on sulfasalazine 1000 mg twice a day for the past 3 months now.  Initially he continued to have migratory joint pain with swelling but over the past 4 weeks his joint pain has now resolved.  Denies any joint pain or  swelling.  He feels back to his normal self  Reports some left medial knee pain, saw a specialist at the joint [place] and they noted he had osteoarthritis and was recommending gel injections  Tolerating sulfasalazine, never went up to 1500 mg twice a day    11/10/2021  Patient presents for follow-up of seropositive RA (+ RF and CCP). Also has transaminitis due to hepatic steatosis   Decreased SSZ, now on 1000 mg in morning and 500 mg at night  Has been on this regimen for the past 3 weeks  Continues to have R knee pain, had gel injection about 2 mos ago, 5 weeks treatment. Suppose to go back in December for cortisone injection. Gel injection did not work  Minimal joint pain and swelling   He is more active now, able to work out and play basketball    2/1/2022-   Pt. Called today b/c was in bad pain - still had a lot of pain even after taking prednisone - high dose - still had left ankle pain - not able to bear weight - using crutches.   He had a bad flare up 2 weeks post covid - polyarituclar flarea -   He has started a prednisone taper - 40mg x 3 days, then 30mg x 3 dasy - and just started 20mg today   his right 4th finger. Left wrist, left knee, right hamstring are all mild now - but were very painful.   He hasn't been to work in 4 days.   He is still finsihing the steroids.   He increased ssz - x 2 weeks no   He was only taking 2 in the am and 1 pm - he never increased it to 6 - so he increase for the last 2 weeks to 7 tablets a day -   He hasn't had a bad flare up like this since June.   He is takind 4 and 3     2/9/2022:  Presents for f/u of Seropositive RA  He remains on sulfasalazine 1500 mg twice a day  R ankle injected last week but continues to have pain in the R ankle.  Last week he did have a lot of swelling in his right ankle.  Now there is no swelling.  Also reports some right shoulder pain.  It has improved since yesterday  Denies any other joint pain or swelling  Blood work last week did show  significantly elevated ESR and CRP  He feels that his joint pain and swelling got worse after getting Covid last month    2/4/2023:  Presents for f/u of Seropositive RA  He remains on sulfasalazine 1500 mg twice a day  Last seen in February 2022  Recent blood work shows normal kidney and liver test.  Slightly elevated ESR 48  Continues to have pain on and off. Can be in hands, shoulders and knees  Has not been able to work out due to the pain  Also was getting swelling in the MCPs  Lost job and insurance and so couldn't see me for about 1 year   On Monday had pain in r shoulder and elbow and significant but then got better    11/28/2023:  Presents for f/u of Seropositive RA  He remains on sulfasalazine 1500 mg daily and  mg daily   Plan was to get approved for Humira but changed insurances and also had indeterminate TB test  Recently had some increased watery eye and then hard to move right side of the face and mouth. Happened a month ago and still has symptoms, cannot close right eye.   Denies any recent infection or virus or fevers  Joints overall are stable, no swelling. No rashes     3/12/2024:  Presents for f/u of Seropositive RA  He remains on sulfasalazine 1000 mg BID and  mg daily   Has been having more flares in the hands, feet. Knees can be painful  Having swelling in the left 3rd finger, can't bend it now  No rash or psoriasis  Was given some steroids in January for a flare and helped   [place] palsy has resolved       HISTORY:  Past Medical History:   Diagnosis Date    PONV (postoperative nausea and vomiting)     nausea      Social Hx Reviewed   Family Hx Reviewed     Medications (Active prior to today's visit):  Current Outpatient Medications   Medication Sig Dispense Refill    predniSONE 10 MG Oral Tab 30 mg daily x3 days then 20 mg daily x3 days then 10 mg daily x3 days then stop (Patient not taking: Reported on 3/12/2024) 20 tablet 0    hydroxychloroquine 200 MG Oral Tab Take 2 tablets  (400 mg total) by mouth daily. 180 tablet 0    sulfaSALAzine 500 MG Oral Tab Take 3 tablets (1,500 mg total) by mouth 2 (two) times daily. (Patient taking differently: Take 4 tablets (2,000 mg total) by mouth daily.) 540 tablet 0    DICLOFENAC 1 % External Gel APPLY 4 G TOPICALLY 4 TIMES DAILY 500 g 0    predniSONE 10 MG Oral Tab 30 mg daily x3 days then 20 mg daily x3 days then 10 mg daily x3 days then stop (Patient not taking: Reported on 3/12/2024) 20 tablet 0    predniSONE 10 MG Oral Tab Take 6 tabsx 1 day, take 5 tabsx 1 day, take 4 tabsx 1 day,take 3 tabsx 1 day, take 2 tabsx 1 day, take 1 tabsx 1 day, then off (Patient not taking: Reported on 3/12/2024) 21 tablet 0     .cmed  Allergies:  Allergies   Allergen Reactions    Seasonal OTHER (SEE COMMENTS)     Runny Nose, watery eyes         ROS:   All other ROS are negative.     PHYSICAL EXAM:   GEN: AAOx3, NAD  HEENT: EOMI, PERRLA, L eye redness  CVS: RRR, no murmurs rubs or gallops. Equal 2+ distal pulses.   LUNGS: CTAB, no increased work of breathing  ABDOMEN:  soft NT/ND, +BS, no HSM  SKIN: No rashes or skin lesions. No nail findings  MSK:  Cervical spine: FROM  Hands: Left 3rd PIP swelling, cannot make a fist with the left hand  Wrist: no swelling, FROM  Elbow: FROM, no pain or swelling or warmth on palpation  Shoulders: FROM, no pain or swelling or warmth on palpation  Hip: normal log roll, no lateral hip pain, CORA test negative b/l  Knees: FROM, no warmth or effusion present. No pain with ROM.   Ankles: Left ankle no swelling, nonTTP  Feet: no pain with MTP squeeze, no toe swelling or pain or warmth on palpation with FROM  Spine: no lumbar or sacral pain on palpation.  NEURO: R sided Bell's palsy  PSYCH: normal mood       LABS:     Component      Latest Ref Rng & Units 6/16/2021 6/7/2021   Quantiferon TB NIL      IU/mL 0.02    Quantiferon-TB1 Minus NIL      IU/mL 0.06    Quantiferon-TB2 Minus NIL      IU/mL 0.01    Quantiferon TB Mitogen minus NIL       IU/mL >10.00    QTB.RESULT      Negative Negative    XANDER Titer/Pattern      <80  320 (A)   Reviewed By:        Tray Bagley M.D.   HBSAg Screen      Nonreactive  Nonreactive    Hbsag Screen Index       [id number]    HEPATITIS B SURFACE AB QUAL      Nonreactive  Nonreactive    HEPATITIS B SURFACE AB QUANT      mIU/mL <3.10    URIC ACID      3.5 - 7.2 mg/dL  5.3   SED RATE      0 - 20 mm/Hr  23 (H)   XANDER SCREEN      Negative  Positive (A)   C-Citrullinated Peptide IgG AB      0.0 - 6.9 U/mL  >340.0 (H)   C-REACTIVE PROTEIN      <0.30 mg/dL 4.27 (H)    RHEUMATOID FACTOR      <15 IU/mL >600 (H)    HEPATITIS B CORE AB, TOTAL      Nonreactive  Nonreactive    GLUCOSE-6-PHOSPHATE DEHYDROGEN      9.9 - 16.6 U/g Hb 16.1    HCV AB      Nonreactive  Nonreactive      Imaging:     US liver:  CONCLUSION:   1. Findings suggestive of diffuse hepatic steatosis.     ASSESSMENT/PLAN:     Seropositive RA (+RF and CCP)- worsened   - Symptoms started migratory joint pain and swelling  - Joint pain overall controlled on sulfasalazine 1000 mg BID and hydroxychloroquine 400 mg daily  - During his last visit he had worsening joint pain and plan was to start Humira but he was found to have + TB and then insurance changes.  But now having worsening joint pain and frequent flares, plan to get approved for ID for +TB, per patient he went on treatment for 4 mos   - Plan to get approved for Humira every 2 weeks   - He was seen by ID March 2023 and was treated with Rifampin for 4 mos   - Recent blood work showing elevated ESR of 97 and CRP of 1.0  - He will be seeing the ophthalmologist in December to evaluate for Plaquenil toxicity  - Not able to take methotrexate or leflunomide due to his fatty liver and fluctuating liver enzymes    Right sided Bell's palsy- resolved   - symptoms were in Nov 2023 but then resolved     L knee OA  - [facility], received gel injections  Left eye pain and redness ? Allergies  - He was seen by  ophthalmology 2021, no evidence of uveitis or iritis  Transaminitis secondary to hepatic steatosis  - Following with GI  - LFTs now normal    Pt will f/u in 3-4 mos     Юлия Bhandari MD  3/12/2024  4:20 PM

## 2024-03-13 LAB — ACE: 43 U/L

## 2024-03-14 LAB — NUCLEAR IGG TITR SER IF: POSITIVE {TITER}

## 2024-03-15 LAB
ANA NUCLEOLAR TITR SER IF: 640 {TITER}
CENTROMERE IGG SER-ACNC: 0.7 U/ML
DSDNA AB TITR SER: <10 {TITER}
ENA JO1 AB SER IA-ACNC: <0.3 U/ML
ENA RNP IGG SER IA-ACNC: 1.5 U/ML
ENA SCL70 IGG SER IA-ACNC: 0.7 U/ML
ENA SM IGG SER IA-ACNC: <0.7 U/ML
ENA SS-A IGG SER IA-ACNC: 0.5 U/ML
ENA SS-B IGG SER IA-ACNC: <0.4 U/ML
U1 SNRNP IGG SER IA-ACNC: 1.4 U/ML

## 2024-03-18 ENCOUNTER — TELEPHONE (OUTPATIENT)
Dept: RHEUMATOLOGY | Facility: CLINIC | Age: 54
End: 2024-03-18

## 2024-03-18 NOTE — TELEPHONE ENCOUNTER
PA start    Prior authorization for: Humira    Medication form: Pen    Submission method: Phone    Date submitted: 3/18/24    Tracking #: Case ID [id number]    QF-TB result: historical positive his treatment        Faxed clinical information to [facility] at [phone].

## 2024-03-18 NOTE — TELEPHONE ENCOUNTER
If we can get patient approved for Humira every 2 weeks for rheumatoid arthritis.  He has a positive TB blood work and was seen by infectious disease March 2023 and was treated with rifampin for 4 months

## 2024-03-18 NOTE — TELEPHONE ENCOUNTER
PA Approved    Prior authorization for: Humira 40mg     Medication form: Pen     Date received: 3/18/24    Approval #: [id number]    Approved dates: 03/18/2024 - 03/18/2025    Pharmacy for medication: [facility]    QF-TB results: historical positive, has received treatment

## 2024-03-19 RX ORDER — ADALIMUMAB 40MG/0.4ML
40 KIT SUBCUTANEOUS
Qty: 2 EACH | Refills: 5 | Status: SHIPPED | OUTPATIENT
Start: 2024-03-19

## 2024-03-19 NOTE — TELEPHONE ENCOUNTER
Spoke to pt about Humira approval and teaching. Pt requested details be sent in [employer] message. Pt will call office back to set-up teaching.

## 2024-03-19 NOTE — TELEPHONE ENCOUNTER
Is pt to stop SSZ or HCQ once he starts Humira? Your note says to continue, but when talking to the pt he said he was told to stop these meds. Please clarify.

## 2024-03-19 NOTE — TELEPHONE ENCOUNTER
Plan is for him continue hydroxychloroquine and Plaquenil for now.  We may eventually be able to stop it but would recommend to continue it now with Humira

## 2024-04-20 NOTE — TELEPHONE ENCOUNTER
LOV: 3/12/24  Last Refilled:#[id number], 0rfs 10/27/23    ASSESSMENT/PLAN:      Seropositive RA (+RF and CCP)- worsened   - Symptoms started migratory joint pain and swelling  - Joint pain overall controlled on sulfasalazine 1000 mg BID and hydroxychloroquine 400 mg daily  - During his last visit he had worsening joint pain and plan was to start Humira but he was found to have + TB and then insurance changes.  But now having worsening joint pain and frequent flares, plan to get approved for ID for +TB, per patient he went on treatment for 4 mos   - Plan to get approved for Humira every 2 weeks   - He was seen by ID March 2023 and was treated with Rifampin for 4 mos   - Recent blood work showing elevated ESR of 97 and CRP of 1.0  - He will be seeing the ophthalmologist in December to evaluate for Plaquenil toxicity  - Not able to take methotrexate or leflunomide due to his fatty liver and fluctuating liver enzymes     Right sided Bell's palsy- resolved   - symptoms were in Nov 2023 but then resolved      L knee OA  - [facility], received gel injections  Left eye pain and redness ? Allergies  - He was seen by ophthalmology 2021, no evidence of uveitis or iritis  Transaminitis secondary to hepatic steatosis  - Following with GI  - LFTs now normal     Pt will f/u in 3-4 mos      Юлия Bhandari MD  3/12/2024  4:20 PM  Please advise.

## 2024-04-22 RX ORDER — HYDROXYCHLOROQUINE SULFATE 200 MG/1
400 TABLET, FILM COATED ORAL DAILY
Qty: 180 TABLET | Refills: 0 | Status: SHIPPED | OUTPATIENT
Start: 2024-04-22

## 2024-04-22 RX ORDER — PREDNISONE 10 MG/1
TABLET ORAL
Qty: 21 TABLET | Refills: 0 | Status: SHIPPED | OUTPATIENT
Start: 2024-04-22

## 2024-04-22 RX ORDER — PREDNISONE 10 MG/1
TABLET ORAL
Qty: 20 TABLET | Refills: 1 | OUTPATIENT
Start: 2024-04-22

## 2024-04-22 NOTE — TELEPHONE ENCOUNTER
Pt calling to get his Prednisone.  [phone]   Leave a message when sent.  He states he is all \"blown up\" and needs right away.

## 2024-04-22 NOTE — TELEPHONE ENCOUNTER
LOV: 3/12/24  Last Refilled:#20, 1rf 3/12/24    ASSESSMENT/PLAN:      Seropositive RA (+RF and CCP)- worsened   - Symptoms started migratory joint pain and swelling  - Joint pain overall controlled on sulfasalazine 1000 mg BID and hydroxychloroquine 400 mg daily  - During his last visit he had worsening joint pain and plan was to start Humira but he was found to have + TB and then insurance changes.  But now having worsening joint pain and frequent flares, plan to get approved for ID for +TB, per patient he went on treatment for 4 mos   - Plan to get approved for Humira every 2 weeks   - He was seen by ID March 2023 and was treated with Rifampin for 4 mos   - Recent blood work showing elevated ESR of 97 and CRP of 1.0  - He will be seeing the ophthalmologist in December to evaluate for Plaquenil toxicity  - Not able to take methotrexate or leflunomide due to his fatty liver and fluctuating liver enzymes     Right sided Bell's palsy- resolved   - symptoms were in Nov 2023 but then resolved      L knee OA  - [facility], received [facility] injections  Left eye pain and redness ? Allergies  - He was seen by ophthalmology 2021, no evidence of uveitis or iritis  Transaminitis secondary to hepatic steatosis  - Following with GI  - LFTs now normal     Pt will f/u in 3-4 mos      Юлия Bhandari MD  3/12/2024  4:20 PM     Please advise.

## 2024-04-22 NOTE — TELEPHONE ENCOUNTER
Requested Prescriptions     Pending Prescriptions Disp Refills    predniSONE 10 MG Oral Tab 20 tablet 1     Si mg daily x3 days then 20 mg daily x3 days then 10 mg daily x3 days then stop     Future Appointments   Date Time Provider Department Center   2024  9:40 AM Юлия Bhandari MD [facility]     LOV: 3/12/24   Last Refilled:3/12/24 #20 1 RF   Labs:  Component      Latest Ref Rng 3/12/2024   WBC      4.0 - 11.0 x10(3) uL 6.9    RBC      4.30 - 5.70 x10(6)uL 6.10 (H)    Hemoglobin      13.0 - 17.5 g/dL 14.4    Hematocrit      39.0 - 53.0 % 45.3    MCV      80.0 - 100.0 fL 74.3 (L)    MCH      26.0 - 34.0 pg 23.6 (L)    MCHC      31.0 - 37.0 g/dL 31.8    RDW-SD      35.1 - 46.3 fL 42.2    RDW      11.0 - 15.0 % 16.1 (H)    Platelet Count      150.0 - 450.0 10(3)uL 293.0    Prelim Neutrophil Abs      1.50 - 7.70 x10 (3) uL 3.07    Neutrophils Absolute      1.50 - 7.70 x10(3) uL 3.07    Lymphocytes Absolute      1.00 - 4.00 x10(3) uL 2.99    Monocytes Absolute      0.10 - 1.00 x10(3) uL 0.55    Eosinophils Absolute      0.00 - 0.70 x10(3) uL 0.26    Basophils Absolute      0.00 - 0.20 x10(3) uL 0.03    Immature Granulocyte Absolute      0.00 - 1.00 x10(3) uL 0.02    Neutrophils %      % 44.4    Lymphocytes %      % 43.2    Monocytes %      % 7.9    Eosinophils %      % 3.8    Basophils %      % 0.4    Immature Granulocyte %      % 0.3    Anti-SSA Antibody, IGG      <7 U/mL 0.5    Anti-SSB Antibody, IGG      <7 U/mL <0.4    Anti-Smith Antibody, IGG      <7 U/mL <0.7    Anti-U1RNP Antibody, IGG      <5 U/mL 1.4    Anti-RNP70 Antibody, IGG      <7 U/mL 1.5    Anti-Centromere Antibody, IGG      <7 U/mL 0.7    Anti-SCL70 Antibody, IGG      <7 U/mL 0.7    Anti-Coleen-1 Antibody, IGG      <7 U/mL <0.3    CREATININE      0.70 - 1.30 mg/dL 0.80    EGFR      >=60 mL/min/1.73m2 106    XANDER Titer/Pattern      <80  640 !    Reviewed By: Tray Bagley M.D.    C-REACTIVE PROTEIN      <1.00 mg/dL 1.00 (H)    SED RATE       0 - 20 mm/Hr 97 (H)    XANDER SCREEN WITH REFLEX (S)      Negative  Positive !    ACE      14 - 82 U/L 43    Albumin      3.2 - 4.8 g/dL 4.4    ALT (SGPT)      10 - 49 U/L 28    AST (SGOT)      <=34 U/L 32    MD Blood Smear Consult Evaluation of the CBC with differential data and the peripheral blood smear demonstrates mild microcytosis without anemia, and mild erythrocytosis. Red blood cells demonstrate mild anisopoikilocytosis with many microcytes, ovalocytes, and target cells.  There are no significant morphologic abnormalities in the white blood cell subsets or platelets.…    Anti Double Strand DNA      <10  <10       Legend:  (H) High  (L) Low  ! Abnormal      ASSESSMENT/PLAN:      Seropositive RA (+RF and CCP)- worsened   - Symptoms started migratory joint pain and swelling  - Joint pain overall controlled on sulfasalazine 1000 mg BID and hydroxychloroquine 400 mg daily  - During his last visit he had worsening joint pain and plan was to start Humira but he was found to have + TB and then insurance changes.  But now having worsening joint pain and frequent flares, plan to get approved for ID for +TB, per patient he went on treatment for 4 mos   - Plan to get approved for Humira every 2 weeks   - He was seen by ID March 2023 and was treated with Rifampin for 4 mos   - Recent blood work showing elevated ESR of 97 and CRP of 1.0  - He will be seeing the ophthalmologist in December to evaluate for Plaquenil toxicity  - Not able to take methotrexate or leflunomide due to his fatty liver and fluctuating liver enzymes     Right sided Bell's palsy- resolved   - symptoms were in Nov 2023 but then resolved      L knee OA  - [facility], received gel injections  Left eye pain and redness ? Allergies  - He was seen by ophthalmology 2021, no evidence of uveitis or iritis  Transaminitis secondary to hepatic steatosis  - Following with GI  - LFTs now normal     Pt will f/u in 3-4 mos      Юлия Bhandari  MD  3/12/2024  4:20 PM

## 2024-07-18 RX ORDER — HYDROXYCHLOROQUINE SULFATE 200 MG/1
400 TABLET, FILM COATED ORAL DAILY
Qty: 180 TABLET | Refills: 0 | Status: SHIPPED | OUTPATIENT
Start: 2024-07-18

## 2024-07-18 NOTE — TELEPHONE ENCOUNTER
LOV: 3/12/24  Last Refilled:#180, 0rfs 4/22/24    ASSESSMENT/PLAN:      Seropositive RA (+RF and CCP)- worsened   - Symptoms started migratory joint pain and swelling  - Joint pain overall controlled on sulfasalazine 1000 mg BID and hydroxychloroquine 400 mg daily  - During his last visit he had worsening joint pain and plan was to start Humira but he was found to have + TB and then insurance changes.  But now having worsening joint pain and frequent flares, plan to get approved for ID for +TB, per patient he went on treatment for 4 mos   - Plan to get approved for Humira every 2 weeks   - He was seen by ID March 2023 and was treated with Rifampin for 4 mos   - Recent blood work showing elevated ESR of 97 and CRP of 1.0  - He will be seeing the ophthalmologist in December to evaluate for Plaquenil toxicity  - Not able to take methotrexate or leflunomide due to his fatty liver and fluctuating liver enzymes     Right sided Bell's palsy- resolved   - symptoms were in Nov 2023 but then resolved      L knee OA  - [facility], received gel injections  Left eye pain and redness ? Allergies  - He was seen by ophthalmology 2021, no evidence of uveitis or iritis  Transaminitis secondary to hepatic steatosis  - Following with GI  - LFTs now normal     Pt will f/u in 3-4 mos      Юлия Bhandari MD  3/12/2024  4:20 PM        Please advise.

## 2024-11-12 ENCOUNTER — TELEPHONE (OUTPATIENT)
Dept: RHEUMATOLOGY | Facility: CLINIC | Age: 54
End: 2024-11-12

## 2024-11-12 NOTE — TELEPHONE ENCOUNTER
Patient called to reschedule his appointment today 11/12/24 to 2/26/25. Patient said he feels under the weather. I did reschedule.

## 2025-02-14 RX ORDER — ADALIMUMAB-RYVK 40MG/0.4ML
40 KIT SUBCUTANEOUS
Qty: 2 EACH | Refills: 5 | Status: SHIPPED | OUTPATIENT
Start: 2025-02-14

## 2025-02-14 NOTE — TELEPHONE ENCOUNTER
Requested Prescriptions     Pending Prescriptions Disp Refills    ADALIMUMAB-RYVK, 2 PEN, 40 MG/0.4ML Subcutaneous Auto-injector Kit [Pharmacy Med Name: ADALIMUMAB-RYVK 40MG/0.4ML AI PEN KIT 2S]  0     Sig: INJECT 40 MG UNDER THE SKIN EVERY 14 DAYS     Future Appointments   Date Time Provider Department Center   4/22/2025  9:20 AM Юлия Bhandari MD [facility]      LOV: 3/12/24  Last Refilled:3/19/24 #2E 5RF        ASSESSMENT/PLAN:      Seropositive RA (+RF and CCP)- worsened   - Symptoms started migratory joint pain and swelling  - Joint pain overall controlled on sulfasalazine 1000 mg BID and hydroxychloroquine 400 mg daily  - During his last visit he had worsening joint pain and plan was to start Humira but he was found to have + TB and then insurance changes.  But now having worsening joint pain and frequent flares, plan to get approved for ID for +TB, per patient he went on treatment for 4 mos   - Plan to get approved for Humira every 2 weeks   - He was seen by ID March 2023 and was treated with Rifampin for 4 mos   - Recent blood work showing elevated ESR of 97 and CRP of 1.0  - He will be seeing the ophthalmologist in December to evaluate for Plaquenil toxicity  - Not able to take methotrexate or leflunomide due to his fatty liver and fluctuating liver enzymes     Right sided Bell's palsy- resolved   - symptoms were in Nov 2023 but then resolved      L knee OA  - [facility], received gel injections  Left eye pain and redness ? Allergies  - He was seen by ophthalmology 2021, no evidence of uveitis or iritis  Transaminitis secondary to hepatic steatosis  - Following with GI  - LFTs now normal     Pt will f/u in 3-4 mos      Юлия Bhandari MD  3/12/2024  4:20 PM

## 2025-04-03 ENCOUNTER — TELEPHONE (OUTPATIENT)
Dept: RHEUMATOLOGY | Facility: CLINIC | Age: 55
End: 2025-04-03

## 2025-04-03 DIAGNOSIS — Z51.81 MEDICATION MONITORING ENCOUNTER: ICD-10-CM

## 2025-04-03 DIAGNOSIS — M06.9 RHEUMATOID ARTHRITIS INVOLVING MULTIPLE SITES, UNSPECIFIED WHETHER RHEUMATOID FACTOR PRESENT (HCC): Primary | ICD-10-CM

## 2025-04-03 NOTE — TELEPHONE ENCOUNTER
Please advice lab orders and generate new lab orders.      Future Appointments   Date Time Provider Department Center   4/22/2025  9:20 AM Юлия Bhandari MD [facility]   5/21/2025  9:00 AM Юлия Bhandari MD [facility]

## 2025-04-21 ENCOUNTER — LAB ENCOUNTER (OUTPATIENT)
Dept: LAB | Facility: HOSPITAL | Age: 55
End: 2025-04-21
Attending: INTERNAL MEDICINE
Payer: COMMERCIAL

## 2025-04-21 DIAGNOSIS — M06.9 RHEUMATOID ARTHRITIS INVOLVING MULTIPLE SITES, UNSPECIFIED WHETHER RHEUMATOID FACTOR PRESENT (HCC): ICD-10-CM

## 2025-04-21 LAB
ALT SERPL-CCNC: 98 U/L (ref 10–49)
AST SERPL-CCNC: 200 U/L (ref ?–34)
BASOPHILS # BLD AUTO: 0.03 X10(3) UL (ref 0–0.2)
BASOPHILS NFR BLD AUTO: 0.4 %
CREAT BLD-MCNC: 0.79 MG/DL (ref 0.7–1.3)
CRP SERPL-MCNC: 1.4 MG/DL (ref ?–1)
DEPRECATED RDW RBC AUTO: 48.6 FL (ref 35.1–46.3)
EGFRCR SERPLBLD CKD-EPI 2021: 106 ML/MIN/1.73M2 (ref 60–?)
EOSINOPHIL # BLD AUTO: 0.27 X10(3) UL (ref 0–0.7)
EOSINOPHIL NFR BLD AUTO: 3.2 %
ERYTHROCYTE [DISTWIDTH] IN BLOOD BY AUTOMATED COUNT: 16.4 % (ref 11–15)
ERYTHROCYTE [SEDIMENTATION RATE] IN BLOOD: 119 MM/HR (ref 0–20)
HCT VFR BLD AUTO: 40.8 % (ref 39–53)
HGB BLD-MCNC: 13.4 G/DL (ref 13–17.5)
IMM GRANULOCYTES # BLD AUTO: 0.02 X10(3) UL (ref 0–1)
IMM GRANULOCYTES NFR BLD: 0.2 %
LYMPHOCYTES # BLD AUTO: 3.44 X10(3) UL (ref 1–4)
LYMPHOCYTES NFR BLD AUTO: 40.1 %
MCH RBC QN AUTO: 26.5 PG (ref 26–34)
MCHC RBC AUTO-ENTMCNC: 32.8 G/DL (ref 31–37)
MCV RBC AUTO: 80.8 FL (ref 80–100)
MONOCYTES # BLD AUTO: 0.78 X10(3) UL (ref 0.1–1)
MONOCYTES NFR BLD AUTO: 9.1 %
NEUTROPHILS # BLD AUTO: 4.03 X10 (3) UL (ref 1.5–7.7)
NEUTROPHILS # BLD AUTO: 4.03 X10(3) UL (ref 1.5–7.7)
NEUTROPHILS NFR BLD AUTO: 47 %
PLATELET # BLD AUTO: 147 10(3)UL (ref 150–450)
RBC # BLD AUTO: 5.05 X10(6)UL (ref 4.3–5.7)
WBC # BLD AUTO: 8.6 X10(3) UL (ref 4–11)

## 2025-04-21 PROCEDURE — 84450 TRANSFERASE (AST) (SGOT): CPT

## 2025-04-21 PROCEDURE — 85025 COMPLETE CBC W/AUTO DIFF WBC: CPT

## 2025-04-21 PROCEDURE — 85652 RBC SED RATE AUTOMATED: CPT

## 2025-04-21 PROCEDURE — 86140 C-REACTIVE PROTEIN: CPT

## 2025-04-21 PROCEDURE — 82565 ASSAY OF CREATININE: CPT

## 2025-04-21 PROCEDURE — 36415 COLL VENOUS BLD VENIPUNCTURE: CPT

## 2025-04-21 PROCEDURE — 84460 ALANINE AMINO (ALT) (SGPT): CPT

## 2025-04-22 ENCOUNTER — OFFICE VISIT (OUTPATIENT)
Age: 55
End: 2025-04-22
Payer: COMMERCIAL

## 2025-04-22 VITALS
SYSTOLIC BLOOD PRESSURE: 136 MMHG | DIASTOLIC BLOOD PRESSURE: 85 MMHG | HEIGHT: 68 IN | HEART RATE: 102 BPM | BODY MASS INDEX: 35 KG/M2

## 2025-04-22 DIAGNOSIS — M06.9 RHEUMATOID ARTHRITIS INVOLVING MULTIPLE SITES, UNSPECIFIED WHETHER RHEUMATOID FACTOR PRESENT (HCC): Primary | ICD-10-CM

## 2025-04-22 DIAGNOSIS — R79.89 ELEVATED LFTS: ICD-10-CM

## 2025-04-22 DIAGNOSIS — Z51.81 MEDICATION MONITORING ENCOUNTER: ICD-10-CM

## 2025-04-22 PROCEDURE — 99214 OFFICE O/P EST MOD 30 MIN: CPT | Performed by: INTERNAL MEDICINE

## 2025-04-22 NOTE — PATIENT INSTRUCTIONS
You were seen today for rheumatoid arthritis  Your joints are doing better on Humira biosimilar  For now continue the injection  Liver enzymes are high  Continue stay active, healthy eating, drink a lot of water  Avoid any Aleve or Motrin or ibuprofen or Tylenol  Repeat blood work in 2 weeks  If it still remains high and may have you see the liver specialist

## 2025-04-22 NOTE — PROGRESS NOTES
Arie David is a 54 year old male.    HPI:     Chief Complaint   Patient presents with    Rheumatoid Arthritis       I had the pleasure of seeing Arie David on 4/22/2025 for follow up Seropositive RA (+RF and CCP).     Current Medications:  Humira biosimilar every 2 weeks- started   Past medications:  SSZ 1000 mg BID- started June 2021-2024   mg daily- started April 2022-2024  Blood work:  ESR 23, CRP 4.27 mg/dL  RF>600, CCP>340  +XANDER 1:320  AST 95, ALT 88  US liver: diffuse hepatic steatosis.     Interval History:  This is a 50 yo M with hx of L ACT and meniscus repair (>15 year ago), Fe deficiency anemia (new) presents with polyarthralgias.  About 2 months ago he started to notice migratory joint pain.  Pain will migrate to different joints which include ankles, wrists, MCPs, elbows even shoulders.  She would also note some swelling of those joints and there would last about 2 days.  At 1 point was unable to walk due to his ankle pain and swelling.  Also at 1 point he was not able to raise the shoulder due to the pain and swelling.  He would take 2 arthritis Aleve when he had the symptoms.  Prior to this he was active, working out and playing basketball.  He is not able to do this due to the pain in his joints.  Blood work did show a positive XANDER and + CCP.  Denies any family history of autoimmune disease.  He was also found to have elevated liver enzymes.  He will be having an ultrasound the liver in the next month  He also states that his eyes swell at times, the start about 3 months ago.  He would notice some redness in his eyes also.  Denies any blurry vision or vision changes.    9/8/2021:  Patient presents for follow-up of seropositive RA (+ RF and CCP). Also has transaminitis due to hepatic steatosis   He has been on sulfasalazine 1000 mg twice a day for the past 3 months now.  Initially he continued to have migratory joint pain with swelling but over the past 4 weeks his joint pain has now  resolved.  Denies any joint pain or swelling.  He feels back to his normal self  Reports some left medial knee pain, saw a specialist at the [facility] and they noted he had osteoarthritis and was recommending gel injections  Tolerating sulfasalazine, never went up to 1500 mg twice a day    11/10/2021  Patient presents for follow-up of seropositive RA (+ RF and CCP). Also has transaminitis due to hepatic steatosis   Decreased SSZ, now on 1000 mg in morning and 500 mg at night  Has been on this regimen for the past 3 weeks  Continues to have R knee pain, had gel injection about 2 mos ago, 5 weeks treatment. Suppose to go back in December for cortisone injection. Gel injection did not work  Minimal joint pain and swelling   He is more active now, able to work out and play basketball    2/1/2022-   Pt. Called today b/c was in bad pain - still had a lot of pain even after taking prednisone - high dose - still had left ankle pain - not able to bear weight - using crutches.   He had a bad flare up 2 weeks post covid - polyarituclar flarea -   He has started a prednisone taper - 40mg x 3 days, then 30mg x 3 dasy - and just started 20mg today   his right 4th finger. Left wrist, left knee, right hamstring are all mild now - but were very painful.   He hasn't been to work in 4 days.   He is still finsihing the steroids.   He increased ssz - x 2 weeks no   He was only taking 2 in the am and 1 pm - he never increased it to 6 - so he increase for the last 2 weeks to 7 tablets a day -   He hasn't had a bad flare up like this since June.   He is takind 4 and 3     2/9/2022:  Presents for f/u of Seropositive RA  He remains on sulfasalazine 1500 mg twice a day  R ankle injected last week but continues to have pain in the R ankle.  Last week he did have a lot of swelling in his right ankle.  Now there is no swelling.  Also reports some right shoulder pain.  It has improved since yesterday  Denies any other joint pain or  swelling  Blood work last week did show significantly elevated ESR and CRP  He feels that his joint pain and swelling got worse after getting Covid last month    2/4/2023:  Presents for f/u of Seropositive RA  He remains on sulfasalazine 1500 mg twice a day  Last seen in February 2022  Recent blood work shows normal kidney and liver test.  Slightly elevated ESR 48  Continues to have pain on and off. Can be in hands, shoulders and knees  Has not been able to work out due to the pain  Also was getting swelling in the MCPs  Lost job and insurance and so couldn't see me for about 1 year   On Monday had pain in r shoulder and elbow and significant but then got better    11/28/2023:  Presents for f/u of Seropositive RA  He remains on sulfasalazine 1500 mg daily and  mg daily   Plan was to get approved for Humira but changed insurances and also had indeterminate TB test  Recently had some increased watery eye and then hard to move right side of the face and mouth. Happened a month ago and still has symptoms, cannot close right eye.   Denies any recent infection or virus or fevers  Joints overall are stable, no swelling. No rashes     3/12/2024:  Presents for f/u of Seropositive RA  He remains on sulfasalazine 1000 mg BID and  mg daily   Has been having more flares in the hands, feet. Knees can be painful  Having swelling in the left 3rd finger, can't bend it now  No rash or psoriasis  Was given some steroids in January for a flare and helped   [place] palsy has resolved     4/22/2025:  Presents for f/u of Seropositive RA  He is on Humira every 2 weeks and joints are doing really well  Joints are really well now, less stiffness  No rashes  Had a recent mild cold, sneezing and runny nose, went away in 3-4 days  Liver enzymes are higher that normal, has hx of hepatic steatosis   Inflammation markers are high         HISTORY:  Past Medical History:    PONV (postoperative nausea and vomiting)    nausea      Social  Hx Reviewed   Family Hx Reviewed     Medications (Active prior to today's visit):  Current Outpatient Medications   Medication Sig Dispense Refill    Adalimumab-ryvk, 2 Pen, 40 MG/0.4ML Subcutaneous Auto-injector Kit INJECT 40 MG UNDER THE SKIN EVERY 14 DAYS (Patient not taking: Reported on 4/22/2025) 2 each 5    HYDROXYCHLOROQUINE 200 MG Oral Tab TAKE 2 TABLETS BY MOUTH EVERY DAY (Patient not taking: Reported on 4/22/2025) 180 tablet 0    predniSONE 10 MG Oral Tab Take 6 tabsx 1 day, take 5 tabsx 1 day, take 4 tabsx 1 day,take 3 tabsx 1 day, take 2 tabsx 1 day, take 1 tabsx 1 day, then off (Patient not taking: Reported on 4/22/2025) 21 tablet 0    Adalimumab (HUMIRA, 2 PEN,) 40 MG/0.4ML Subcutaneous Pen-injector Kit Inject 40 mg into the skin every 14 (fourteen) days. 2 each 5    predniSONE 10 MG Oral Tab 30 mg daily x3 days then 20 mg daily x3 days then 10 mg daily x3 days then stop (Patient not taking: Reported on 4/22/2025) 20 tablet 1    predniSONE 10 MG Oral Tab 30 mg daily x3 days then 20 mg daily x3 days then 10 mg daily x3 days then stop (Patient not taking: Reported on 4/22/2025) 20 tablet 0    predniSONE 10 MG Oral Tab 30 mg daily x3 days then 20 mg daily x3 days then 10 mg daily x3 days then stop (Patient not taking: Reported on 4/22/2025) 20 tablet 0    sulfaSALAzine 500 MG Oral Tab Take 3 tablets (1,500 mg total) by mouth 2 (two) times daily. (Patient not taking: Reported on 4/22/2025) 540 tablet 0    DICLOFENAC 1 % External Gel APPLY 4 G TOPICALLY 4 TIMES DAILY (Patient not taking: Reported on 4/22/2025) 500 g 0     .cmed  Allergies:  Allergies   Allergen Reactions    Seasonal OTHER (SEE COMMENTS)     Runny Nose, watery eyes         ROS:   All other ROS are negative.     PHYSICAL EXAM:   GEN: AAOx3, NAD  HEENT: EOMI, PERRLA, L eye redness  CVS: RRR, no murmurs rubs or gallops. Equal 2+ distal pulses.   LUNGS: CTAB, no increased work of breathing  ABDOMEN:  soft NT/ND, +BS, no HSM  SKIN: No rashes or  skin lesions. No nail findings  MSK:  Cervical spine: FROM  Hands: No swelling or synovitis on exam.  Able to make a full fist  Wrist: no swelling, FROM  Elbow: FROM, no pain or swelling or warmth on palpation  Shoulders: FROM, no pain or swelling or warmth on palpation  Hip: normal log roll, no lateral hip pain, CORA test negative b/l  Knees: FROM, no warmth or effusion present. No pain with ROM.   Ankles: Left ankle no swelling, nonTTP  Feet: no pain with MTP squeeze, no toe swelling or pain or warmth on palpation with FROM  Spine: no lumbar or sacral pain on palpation.  NEURO: R sided Bell's palsy  PSYCH: normal mood       LABS:     Component      Latest Ref Rng & Units 6/16/2021 6/7/2021   Quantiferon TB NIL      IU/mL 0.02    Quantiferon-TB1 Minus NIL      IU/mL 0.06    Quantiferon-TB2 Minus NIL      IU/mL 0.01    Quantiferon TB Mitogen minus NIL      IU/mL >10.00    QTB.RESULT      Negative Negative    XANDER Titer/Pattern      <80  320 (A)   Reviewed By:        Tray Bagley M.D.   HBSAg Screen      Nonreactive  Nonreactive    Hbsag Screen Index       [id number]    HEPATITIS B SURFACE AB QUAL      Nonreactive  Nonreactive    HEPATITIS B SURFACE AB QUANT      mIU/mL <3.10    URIC ACID      3.5 - 7.2 mg/dL  5.3   SED RATE      0 - 20 mm/Hr  23 (H)   XANDER SCREEN      Negative  Positive (A)   C-Citrullinated Peptide IgG AB      0.0 - 6.9 U/mL  >340.0 (H)   C-REACTIVE PROTEIN      <0.30 mg/dL 4.27 (H)    RHEUMATOID FACTOR      <15 IU/mL >600 (H)    HEPATITIS B CORE AB, TOTAL      Nonreactive  Nonreactive    GLUCOSE-6-PHOSPHATE DEHYDROGEN      9.9 - 16.6 U/g Hb 16.1    HCV AB      Nonreactive  Nonreactive      Imaging:     US liver:  CONCLUSION:   1. Findings suggestive of diffuse hepatic steatosis.     ASSESSMENT/PLAN:     Seropositive RA (+RF and CCP)- improved   - During last visit 2024 he was having worsening joint pain and swelling  - Now on Humira biosimilar every 2 weeks and joints are doing well.  Less pain,  stiffness and swelling  - Recent blood work showing elevated inflammation markers.  He had a recent cold  - Blood work also showing elevated LFTs, he has a history of hepatic steatosis but liver enzymes are significantly high.  He states that he is eating better, not taking any NSAIDs or Tylenol  - Plan to continue Humira biosimilar for now    Elevated LFTs, history of transaminitis secondary to hepatic steatosis  - Liver enzymes normalized eventually but now elevated again  - Plan to repeat blood work in 2 weeks  - If they remain elevated would recommend to see liver specialist again Dr. Mitchell    Right sided Bell's palsy- resolved   - symptoms were in Nov 2023 but then resolved     L knee OA  - [facility], received gel injections  Left eye pain and redness ? Allergies  - He was seen by ophthalmology 2021, no evidence of uveitis or iritis  Transaminitis secondary to hepatic steatosis  - Following with GI  - LFTs now normal    Pt will f/u in 3-4 mos     There is a longitudinal care relationship with me, the care plan reflects the ongoing nature of the continuous relationship of care, and the medical record indicates that there is ongoing treatment of a serious/complex medical condition which I am currently managing.  is Applicable.     Юлия Bhandari MD  4/22/2025  8:20 AM

## 2025-05-13 ENCOUNTER — LAB ENCOUNTER (OUTPATIENT)
Dept: LAB | Facility: HOSPITAL | Age: 55
End: 2025-05-13
Attending: INTERNAL MEDICINE
Payer: COMMERCIAL

## 2025-05-13 DIAGNOSIS — M06.9 RHEUMATOID ARTHRITIS INVOLVING MULTIPLE SITES, UNSPECIFIED WHETHER RHEUMATOID FACTOR PRESENT (HCC): ICD-10-CM

## 2025-05-13 LAB
ALT SERPL-CCNC: 84 U/L (ref 10–49)
AST SERPL-CCNC: 175 U/L (ref ?–34)
BASOPHILS # BLD AUTO: 0.03 X10(3) UL (ref 0–0.2)
BASOPHILS NFR BLD AUTO: 0.5 %
CREAT BLD-MCNC: 0.84 MG/DL (ref 0.7–1.3)
CRP SERPL-MCNC: 1.1 MG/DL (ref ?–1)
DEPRECATED RDW RBC AUTO: 46.8 FL (ref 35.1–46.3)
EGFRCR SERPLBLD CKD-EPI 2021: 103 ML/MIN/1.73M2 (ref 60–?)
EOSINOPHIL # BLD AUTO: 0.23 X10(3) UL (ref 0–0.7)
EOSINOPHIL NFR BLD AUTO: 3.5 %
ERYTHROCYTE [DISTWIDTH] IN BLOOD BY AUTOMATED COUNT: 16.2 % (ref 11–15)
ERYTHROCYTE [SEDIMENTATION RATE] IN BLOOD: 121 MM/HR (ref 0–20)
HBV CORE AB SERPL QL IA: NONREACTIVE
HBV SURFACE AB SER QL: NONREACTIVE
HBV SURFACE AB SERPL IA-ACNC: 3.93 MIU/ML
HBV SURFACE AG SER-ACNC: <0.1 [IU]/L
HBV SURFACE AG SERPL QL IA: NONREACTIVE
HCT VFR BLD AUTO: 41.5 % (ref 39–53)
HCV AB SERPL QL IA: NONREACTIVE
HGB BLD-MCNC: 13.3 G/DL (ref 13–17.5)
IMM GRANULOCYTES # BLD AUTO: 0.01 X10(3) UL (ref 0–1)
IMM GRANULOCYTES NFR BLD: 0.2 %
LYMPHOCYTES # BLD AUTO: 2.61 X10(3) UL (ref 1–4)
LYMPHOCYTES NFR BLD AUTO: 39.8 %
MCH RBC QN AUTO: 25.3 PG (ref 26–34)
MCHC RBC AUTO-ENTMCNC: 32 G/DL (ref 31–37)
MCV RBC AUTO: 79 FL (ref 80–100)
MONOCYTES # BLD AUTO: 0.63 X10(3) UL (ref 0.1–1)
MONOCYTES NFR BLD AUTO: 9.6 %
NEUTROPHILS # BLD AUTO: 3.05 X10 (3) UL (ref 1.5–7.7)
NEUTROPHILS # BLD AUTO: 3.05 X10(3) UL (ref 1.5–7.7)
NEUTROPHILS NFR BLD AUTO: 46.4 %
PLATELET # BLD AUTO: 145 10(3)UL (ref 150–450)
RBC # BLD AUTO: 5.25 X10(6)UL (ref 4.3–5.7)
WBC # BLD AUTO: 6.6 X10(3) UL (ref 4–11)

## 2025-05-13 PROCEDURE — 36415 COLL VENOUS BLD VENIPUNCTURE: CPT

## 2025-05-13 PROCEDURE — 85652 RBC SED RATE AUTOMATED: CPT

## 2025-05-13 PROCEDURE — 87340 HEPATITIS B SURFACE AG IA: CPT | Performed by: INTERNAL MEDICINE

## 2025-05-13 PROCEDURE — 85025 COMPLETE CBC W/AUTO DIFF WBC: CPT

## 2025-05-13 PROCEDURE — 86704 HEP B CORE ANTIBODY TOTAL: CPT | Performed by: INTERNAL MEDICINE

## 2025-05-13 PROCEDURE — 84460 ALANINE AMINO (ALT) (SGPT): CPT

## 2025-05-13 PROCEDURE — 82565 ASSAY OF CREATININE: CPT

## 2025-05-13 PROCEDURE — 86803 HEPATITIS C AB TEST: CPT | Performed by: INTERNAL MEDICINE

## 2025-05-13 PROCEDURE — 86706 HEP B SURFACE ANTIBODY: CPT | Performed by: INTERNAL MEDICINE

## 2025-05-13 PROCEDURE — 86140 C-REACTIVE PROTEIN: CPT

## 2025-05-13 PROCEDURE — 84450 TRANSFERASE (AST) (SGOT): CPT

## 2025-05-19 ENCOUNTER — TELEPHONE (OUTPATIENT)
Age: 55
End: 2025-05-19

## 2025-05-19 DIAGNOSIS — R79.89 ELEVATED LFTS: Primary | ICD-10-CM

## 2025-05-19 NOTE — TELEPHONE ENCOUNTER
I placed referral to mental health for psychiatry.    Neurologist referral given on 2/13/20.     Donnie Mansfield PA-C  [facility]      Patient called back verified patient name and .  Informed patient he should reach out to his PCP Dr. Rose regarding his burning sensation when urinating. Patient states it only happened this morning and now he is not having it. But he usually has only dealt with Dr. Bhandari. Sent PCP information via [employer] to patient. Patient would also like to know about recent lab results see. TE 25

## 2025-06-04 ENCOUNTER — OFFICE VISIT (OUTPATIENT)
Dept: GASTROENTEROLOGY | Facility: CLINIC | Age: 55
End: 2025-06-04

## 2025-06-04 VITALS
HEART RATE: 84 BPM | BODY MASS INDEX: 41.37 KG/M2 | HEIGHT: 68 IN | DIASTOLIC BLOOD PRESSURE: 84 MMHG | SYSTOLIC BLOOD PRESSURE: 146 MMHG | WEIGHT: 273 LBS

## 2025-06-04 DIAGNOSIS — K63.5 POLYP OF COLON, UNSPECIFIED PART OF COLON, UNSPECIFIED TYPE: ICD-10-CM

## 2025-06-04 DIAGNOSIS — D50.9 IRON DEFICIENCY ANEMIA, UNSPECIFIED IRON DEFICIENCY ANEMIA TYPE: ICD-10-CM

## 2025-06-04 DIAGNOSIS — R74.8 ELEVATED LIVER ENZYMES: Primary | ICD-10-CM

## 2025-06-04 PROCEDURE — 99204 OFFICE O/P NEW MOD 45 MIN: CPT | Performed by: NURSE PRACTITIONER

## 2025-06-04 NOTE — PATIENT INSTRUCTIONS
-low-fat diet  -healthy bmi  -monitor cholesterol, blood sugar w/ pcp  -hep b vaccine with pcp  -repeat labs  -elastography  -avoid hepatotoxic agents  -f/U with Dr. Mitchell to consider liver biopsy  -c-scope 7/2026    -see Hepatology (liver MD)  Dr. Ulysses Mitchell   T. [phone]

## 2025-06-04 NOTE — H&P
[facility] - Gastroenterology                                                                                                               Reason for consult: eval    Requesting physician or provider: Julian Rose MD    Chief Complaint   Patient presents with    Elevated Liver Chemistry       HPI:   Arie David is a 55 year old year-old male with history of htn:    he is here today for evaluation  Elevated lfts    Last seen 2021    Initial visit for iron def anemia  S/p liver w/u and imaging  Recommendations for   Low-fat diet, weight loss    Known to Dr. Mitchell- consult 2021    Consult for elevated liver tests. Imaging with fatty liver. No significant alcohol. Starting to eating healthier as of couple weeks ago. Sulfasalazine started June '21 and LFT elevated before. Did do medrol dose pack for joints around June '21. AST>ALT at times. Lifts weights 2-3 times/week. No Family history of liver disease. Serologic testing notable for +XANDER 1:320, but otherwise unremarkable     He denies upper abd pain, jaundice.  Not on pred or sulfa  Receiving humira injections    he moves his bowels 2 times per day and without recent change. he denies straining and/or incomplete evacuation.  he denies brbpr and/or melena.    he denies acid reflux and/or heartburn. he denies dysphagia, odynophagia and/or globus.  he denies nausea and/or vomiting.  he denies recent change in appetite and/or unintentional weight loss.    egd/cln 7/2021 w/ Dr. Weber   No etiology for COLIN  (-) h.pylori, im, dysplasia  Hyperplastic gastric polyp  TA in AC x 2  TC polyp-colonic mucosa    VCE 10/1/21 w/o finding to explain COLIN  Stopped oral iron 3 weeks ago  Feels better he says-less tired     HTN-no HA, Cp, vision change  No jaundice, pruritus, drowsiness, epigastric pain, brenda colored stool, dark urine     NSAIDS:  stopped naproxen  Tobacco: no  Alcohol:  no  Illicit drugs: no  Acetaminophen: no  Takes turmeric and lavern for inflammation     No FH GI malignancy, ibd, celiac  No fhx liver disease    Wt Readings from Last 6 Encounters:   06/04/25 273 lb (123.8 kg)   02/04/23 229 lb (103.9 kg)   02/15/22 241 lb (109.3 kg)   02/09/22 240 lb (108.9 kg)   02/01/22 240 lb (108.9 kg)   11/22/21 252 lb (114.3 kg)        History, Medications, Allergies, ROS:      Past Medical History[1]   Past Surgical History[2]   Family Hx: Family History[3]   Social History: Short Social Hx on File[4]     Medications (Active prior to today's visit):  Current Medications[5]    Allergies:  Allergies[6]    ROS:   CONSTITUTIONAL: negative for fevers, chills, sweats and weight loss  EYES Negative for red eyes, yellow eyes, changes in vision  HEENT: Negative for dysphagia and hoarseness  RESPIRATORY: Negative for cough and shortness of breath  CARDIOVASCULAR: Negative for chest pain, palpitations  GASTROINTESTINAL: See HPI  GENITOURINARY: Negative for dysuria and frequency  MUSCULOSKELETAL: Negative for arthralgias and myalgias  NEUROLOGICAL: Negative for dizziness and headaches  BEHAVIOR/PSYCH: Negative for anxiety and poor appetite    PHYSICAL EXAM:   Blood pressure (!) 173/93, pulse 84, weight 273 lb (123.8 kg).    GEN: WD/WN, NAD  HEENT: Supple symmetrical, trachea midline  CV: RRR, the extremities are warm and well perfused   LUNGS: No increased work of breathing  ABDOMEN: No scars, normal bowel sounds, soft, non-tender, non-distended no rebound or guarding, no masses, no hepatomegaly  MSK: No redness, no warmth, no swelling of joints  SKIN: No jaundice, no erythema, no rashes  HEMATOLOGIC: No bleeding, no bruising  NEURO: Alert and interactive, normal gait    Labs/Imaging/Procedures:     Patient's pertinent labs and imaging were reviewed and discussed with patient today.        .  ASSESSMENT/PLAN:   Arie David is a 55 year old year-old male with history of htn:    #colon polyps  TA in AC  x 2 removed. Recommendation for repeat cln in 5 years (7/2026)    #COLIN  S/p cln/egd/vce w/o significant finding. Last CBC 5/2025 unremarkable.     #elevated lfts  Thought to be 2/2 fatty liver.  Elevated XANDER (RA + RF, CCP). Received steroids June 2021 - lfts still elevated 9/2021. Received pred again 2/2022 - lfts normal 2/2022 (was on sulfa). Ck low (2/2022). Did not have elastography. Not immune to hep B.     -low-fat diet  -healthy bmi  -monitor cholesterol, blood sugar w/ pcp  -hep b vaccine with pcp  -repeat labs  -elastography  -avoid hepatotoxic agents  -f/U with Dr. Mitchell to consider liver biopsy  -c-scope 7/2026    -see Hepatology (liver MD)  Dr. Ulysses NEWMAN [phone]      Orders This Visit:  Orders Placed This Encounter   Procedures    Hepatic Function Panel (7)       Meds This Visit:  Requested Prescriptions      No prescriptions requested or ordered in this encounter       Imaging & Referrals:  US LIVER WITH ELASTOGRAPHY(CPT=76705,66460)      Ashley Terry, APRN   6/4/2025        This note was partially prepared using Dragon Medical voice recognition dictation software. As a result, errors may occur. When identified, these errors have been corrected. While every attempt is made to correct errors during dictation, discrepancies may still exist.          [1]   Past Medical History:   PONV (postoperative nausea and vomiting)    nausea   [2]   Past Surgical History:  Procedure Laterality Date    Colonoscopy N/A 7/15/2021    Procedure: COLONOSCOPY/ESOPHAGOGASTRODUODENOSCOPY;  Surgeon: Otis Weber MD;  Location: [facility]   [3]   Family History  Problem Relation Age of Onset    No Known Problems Father     No Known Problems Mother     Diabetes Brother     Glaucoma Neg     Macular degeneration Neg    [4]   Social History  Socioeconomic History    Marital status: Single   Tobacco Use    Smoking status: Never    Smokeless tobacco: Never   Vaping Use    Vaping status: Never Used   Substance and  Sexual Activity    Alcohol use: Not Currently     Comment: occassionally    Drug use: No   [5]   Current Outpatient Medications   Medication Sig Dispense Refill    Adalimumab (HUMIRA, 2 PEN,) 40 MG/0.4ML Subcutaneous Pen-injector Kit Inject 40 mg into the skin every 14 (fourteen) days. 2 each 5    Adalimumab-ryvk, 2 Pen, 40 MG/0.4ML Subcutaneous Auto-injector Kit INJECT 40 MG UNDER THE SKIN EVERY 14 DAYS (Patient not taking: Reported on 4/22/2025) 2 each 5   [6]   Allergies  Allergen Reactions    Seasonal OTHER (SEE COMMENTS)     Runny Nose, watery eyes

## 2025-06-23 ENCOUNTER — LAB ENCOUNTER (OUTPATIENT)
Dept: LAB | Facility: HOSPITAL | Age: 55
End: 2025-06-23
Attending: NURSE PRACTITIONER
Payer: COMMERCIAL

## 2025-06-23 DIAGNOSIS — M06.9 RHEUMATOID ARTHRITIS INVOLVING MULTIPLE SITES, UNSPECIFIED WHETHER RHEUMATOID FACTOR PRESENT (HCC): ICD-10-CM

## 2025-06-23 DIAGNOSIS — R74.8 ELEVATED LIVER ENZYMES: ICD-10-CM

## 2025-06-23 LAB
ALBUMIN SERPL-MCNC: 3.4 G/DL (ref 3.2–4.8)
ALP LIVER SERPL-CCNC: 221 U/L (ref 45–117)
ALT SERPL-CCNC: 39 U/L (ref 10–49)
AST SERPL-CCNC: 156 U/L (ref ?–34)
BASOPHILS # BLD AUTO: 0.03 X10(3) UL (ref 0–0.2)
BASOPHILS NFR BLD AUTO: 0.4 %
BILIRUB DIRECT SERPL-MCNC: 0.4 MG/DL (ref ?–0.3)
BILIRUB SERPL-MCNC: 1 MG/DL (ref 0.3–1.2)
CREAT BLD-MCNC: 0.79 MG/DL (ref 0.7–1.3)
CRP SERPL-MCNC: 1.4 MG/DL (ref ?–1)
DEPRECATED RDW RBC AUTO: 47.9 FL (ref 35.1–46.3)
EGFRCR SERPLBLD CKD-EPI 2021: 105 ML/MIN/1.73M2 (ref 60–?)
EOSINOPHIL # BLD AUTO: 0.21 X10(3) UL (ref 0–0.7)
EOSINOPHIL NFR BLD AUTO: 2.8 %
ERYTHROCYTE [DISTWIDTH] IN BLOOD BY AUTOMATED COUNT: 16.7 % (ref 11–15)
ERYTHROCYTE [SEDIMENTATION RATE] IN BLOOD: >130 MM/HR (ref 0–20)
HCT VFR BLD AUTO: 40 % (ref 39–53)
HGB BLD-MCNC: 13.3 G/DL (ref 13–17.5)
IMM GRANULOCYTES # BLD AUTO: 0.01 X10(3) UL (ref 0–1)
IMM GRANULOCYTES NFR BLD: 0.1 %
LYMPHOCYTES # BLD AUTO: 3.1 X10(3) UL (ref 1–4)
LYMPHOCYTES NFR BLD AUTO: 41.7 %
MCH RBC QN AUTO: 26.5 PG (ref 26–34)
MCHC RBC AUTO-ENTMCNC: 33.3 G/DL (ref 31–37)
MCV RBC AUTO: 79.7 FL (ref 80–100)
MONOCYTES # BLD AUTO: 0.6 X10(3) UL (ref 0.1–1)
MONOCYTES NFR BLD AUTO: 8.1 %
NEUTROPHILS # BLD AUTO: 3.48 X10 (3) UL (ref 1.5–7.7)
NEUTROPHILS # BLD AUTO: 3.48 X10(3) UL (ref 1.5–7.7)
NEUTROPHILS NFR BLD AUTO: 46.9 %
PLATELET # BLD AUTO: 143 10(3)UL (ref 150–450)
PROT SERPL-MCNC: 9 G/DL (ref 5.7–8.2)
RBC # BLD AUTO: 5.02 X10(6)UL (ref 4.3–5.7)
WBC # BLD AUTO: 7.4 X10(3) UL (ref 4–11)

## 2025-06-23 PROCEDURE — 85025 COMPLETE CBC W/AUTO DIFF WBC: CPT

## 2025-06-23 PROCEDURE — 85652 RBC SED RATE AUTOMATED: CPT

## 2025-06-23 PROCEDURE — 80076 HEPATIC FUNCTION PANEL: CPT

## 2025-06-23 PROCEDURE — 82565 ASSAY OF CREATININE: CPT

## 2025-06-23 PROCEDURE — 86140 C-REACTIVE PROTEIN: CPT

## 2025-06-23 PROCEDURE — 36415 COLL VENOUS BLD VENIPUNCTURE: CPT

## 2025-07-07 ENCOUNTER — TELEPHONE (OUTPATIENT)
Facility: CLINIC | Age: 55
End: 2025-07-07

## 2025-07-07 NOTE — TELEPHONE ENCOUNTER
1st,Overdue reminder letter sent out via My chart for the following:  US LIVER WITH ELASTOGRAPHY(CPT=76705,18181) (Order #[id number]) on 6/4/25

## 2025-07-18 NOTE — TELEPHONE ENCOUNTER
Requested Prescriptions     Pending Prescriptions Disp Refills    ADALIMUMAB-RYVK, 2 PEN, 40 MG/0.4ML Subcutaneous Auto-injector Kit [Pharmacy Med Name: ADALIMUMAB-RYVK 40MG/0.4ML AI PEN KIT 2S]  0     Sig: INJECT 40 MG UNDER THE SKIN EVERY 14 DAYS     LOV: 4/22/25  Future Appointments   Date Time Provider Department Center   8/13/2025  7:30 AM [facility] [place]   10/22/2025  3:40 PM Юлия Bhandari MD [facility]     Labs:   Component      Latest Ref Rng 5/13/2025 6/23/2025   WBC      4.0 - 11.0 x10(3) uL 6.6  7.4    RBC      4.30 - 5.70 x10(6)uL 5.25  5.02    Hemoglobin      13.0 - 17.5 g/dL 13.3  13.3    Hematocrit      39.0 - 53.0 % 41.5  40.0    MCV      80.0 - 100.0 fL 79.0 (L)  79.7 (L)    MCH      26.0 - 34.0 pg 25.3 (L)  26.5    MCHC      31.0 - 37.0 g/dL 32.0  33.3    RDW-SD      35.1 - 46.3 fL 46.8 (H)  47.9 (H)    RDW      11.0 - 15.0 % 16.2 (H)  16.7 (H)    Platelet Count      150.0 - 450.0 10(3)uL 145.0 (L)  143.0 (L)    Prelim Neutrophil Abs      1.50 - 7.70 x10 (3) uL 3.05  3.48    Neutrophils Absolute      1.50 - 7.70 x10(3) uL 3.05  3.48    Lymphocytes Absolute      1.00 - 4.00 x10(3) uL 2.61  3.10    Monocytes Absolute      0.10 - 1.00 x10(3) uL 0.63  0.60    Eosinophils Absolute      0.00 - 0.70 x10(3) uL 0.23  0.21    Basophils Absolute      0.00 - 0.20 x10(3) uL 0.03  0.03    Immature Granulocyte Absolute      0.00 - 1.00 x10(3) uL 0.01  0.01    Neutrophils %      % 46.4  46.9    Lymphocytes %      % 39.8  41.7    Monocytes %      % 9.6  8.1    Eosinophils %      % 3.5  2.8    Basophils %      % 0.5  0.4    Immature Granulocyte %      % 0.2  0.1    AST (SGOT)      <34 U/L 175 (H)  156 (H)    ALT (SGPT)      10 - 49 U/L 84 (H)  39    ALKALINE PHOSPHATASE      45 - 117 U/L  221 (H)    Total Bilirubin      0.3 - 1.2 mg/dL  1.0    Bilirubin, Direct      <=0.3 mg/dL  0.4 (H)    PROTEIN, TOTAL      5.7 - 8.2 g/dL  9.0 (H)    Albumin      3.2 - 4.8 g/dL  3.4    HEPATITIS B  SURFACE AB QUAL      Reactive   Nonreactive !     HEPATITIS B SURFACE AB QUANT      mIU/mL [id number]     HBSAg Screen      Nonreactive   Nonreactive     Hbsag Screen Index <0.10     CREATININE      0.70 - 1.30 mg/dL 0.84  0.79    EGFR      >=60 mL/min/1.73m2 103  105    HEPATITIS B CORE AB, TOTAL      Nonreactive   Nonreactive     HCV AB      Nonreactive   Nonreactive     SED RATE      0 - 20 mm/Hr 121 (H)  >130 (H)    C-REACTIVE PROTEIN      <1.00 mg/dL 1.10 (H)  1.40 (H)       Legend:  (L) Low  (H) High  ! Abnormal      Instructions    You were seen today for rheumatoid arthritis  Your joints are doing better on Humira biosimilar  For now continue the injection  Liver enzymes are high  Continue stay active, healthy eating, drink a lot of water  Avoid any Aleve or Motrin or ibuprofen or Tylenol  Repeat blood work in 2 weeks  If it still remains high and may have you see the liver specialist

## 2025-07-20 RX ORDER — ADALIMUMAB-RYVK 40MG/0.4ML
40 KIT SUBCUTANEOUS
Qty: 2 EACH | Refills: 5 | Status: SHIPPED | OUTPATIENT
Start: 2025-07-20

## 2025-08-13 ENCOUNTER — TELEPHONE (OUTPATIENT)
Facility: CLINIC | Age: 55
End: 2025-08-13

## 2025-08-13 ENCOUNTER — HOSPITAL ENCOUNTER (OUTPATIENT)
Dept: ULTRASOUND IMAGING | Facility: HOSPITAL | Age: 55
Discharge: HOME | End: 2025-08-13
Attending: NURSE PRACTITIONER

## 2025-08-13 DIAGNOSIS — R74.8 ELEVATED LIVER ENZYMES: ICD-10-CM

## 2025-08-13 PROCEDURE — 76705 ECHO EXAM OF ABDOMEN: CPT | Performed by: NURSE PRACTITIONER

## 2025-08-13 PROCEDURE — 76981 USE PARENCHYMA: CPT | Performed by: NURSE PRACTITIONER

## 2025-08-28 ENCOUNTER — LAB ENCOUNTER (OUTPATIENT)
Dept: LAB | Facility: HOSPITAL | Age: 55
End: 2025-08-28
Attending: NURSE PRACTITIONER

## 2025-08-28 DIAGNOSIS — K76.0 HEPATIC STEATOSIS: ICD-10-CM

## 2025-08-28 DIAGNOSIS — M06.9 RHEUMATOID ARTHRITIS INVOLVING MULTIPLE SITES, UNSPECIFIED WHETHER RHEUMATOID FACTOR PRESENT (HCC): ICD-10-CM

## 2025-08-28 LAB
AFP-TM SERPL-MCNC: 4.7 NG/ML (ref ?–8)
ALBUMIN SERPL-MCNC: 3.4 G/DL (ref 3.2–4.8)
ALP LIVER SERPL-CCNC: 194 U/L (ref 45–117)
ALT SERPL-CCNC: 72 U/L (ref 10–49)
AST SERPL-CCNC: 148 U/L (ref ?–34)
BASOPHILS # BLD AUTO: 0.03 X10(3) UL (ref 0–0.2)
BASOPHILS NFR BLD AUTO: 0.5 %
BILIRUB DIRECT SERPL-MCNC: 0.4 MG/DL (ref ?–0.3)
BILIRUB SERPL-MCNC: 0.9 MG/DL (ref 0.3–1.2)
CREAT BLD-MCNC: 0.87 MG/DL (ref 0.7–1.3)
CRP SERPL-MCNC: <0.5 MG/DL (ref ?–0.5)
DEPRECATED RDW RBC AUTO: 51.3 FL (ref 35.1–46.3)
EGFRCR SERPLBLD CKD-EPI 2021: 102 ML/MIN/1.73M2 (ref 60–?)
EOSINOPHIL # BLD AUTO: 0.13 X10(3) UL (ref 0–0.7)
EOSINOPHIL NFR BLD AUTO: 2.2 %
ERYTHROCYTE [DISTWIDTH] IN BLOOD BY AUTOMATED COUNT: 17.5 % (ref 11–15)
ERYTHROCYTE [SEDIMENTATION RATE] IN BLOOD: 105 MM/HR (ref 0–20)
HCT VFR BLD AUTO: 39.6 % (ref 39–53)
HGB BLD-MCNC: 13.3 G/DL (ref 13–17.5)
IMM GRANULOCYTES # BLD AUTO: 0.01 X10(3) UL (ref 0–1)
IMM GRANULOCYTES NFR BLD: 0.2 %
LYMPHOCYTES # BLD AUTO: 2.77 X10(3) UL (ref 1–4)
LYMPHOCYTES NFR BLD AUTO: 47.1 %
MCH RBC QN AUTO: 27 PG (ref 26–34)
MCHC RBC AUTO-ENTMCNC: 33.6 G/DL (ref 31–37)
MCV RBC AUTO: 80.5 FL (ref 80–100)
MONOCYTES # BLD AUTO: 0.64 X10(3) UL (ref 0.1–1)
MONOCYTES NFR BLD AUTO: 10.9 %
NEUTROPHILS # BLD AUTO: 2.3 X10 (3) UL (ref 1.5–7.7)
NEUTROPHILS # BLD AUTO: 2.3 X10(3) UL (ref 1.5–7.7)
NEUTROPHILS NFR BLD AUTO: 39.1 %
PLATELET # BLD AUTO: 110 10(3)UL (ref 150–450)
PLATELETS.RETICULATED NFR BLD AUTO: 5.4 % (ref 0–7)
PROT SERPL-MCNC: 8.7 G/DL (ref 5.7–8.2)
RBC # BLD AUTO: 4.92 X10(6)UL (ref 4.3–5.7)
WBC # BLD AUTO: 5.9 X10(3) UL (ref 4–11)

## 2025-08-28 PROCEDURE — 85025 COMPLETE CBC W/AUTO DIFF WBC: CPT

## 2025-08-28 PROCEDURE — 36415 COLL VENOUS BLD VENIPUNCTURE: CPT

## 2025-08-28 PROCEDURE — 80076 HEPATIC FUNCTION PANEL: CPT

## 2025-08-28 PROCEDURE — 82105 ALPHA-FETOPROTEIN SERUM: CPT

## 2025-08-28 PROCEDURE — 86140 C-REACTIVE PROTEIN: CPT

## 2025-08-28 PROCEDURE — 85652 RBC SED RATE AUTOMATED: CPT

## 2025-08-28 PROCEDURE — 82565 ASSAY OF CREATININE: CPT

## (undated) DIAGNOSIS — M06.9 RHEUMATOID ARTHRITIS INVOLVING MULTIPLE SITES, UNSPECIFIED WHETHER RHEUMATOID FACTOR PRESENT (HCC): ICD-10-CM

## (undated) DEVICE — Device: Brand: CUSTOM PROCEDURE KIT

## (undated) DEVICE — STERILE LATEX POWDER-FREE SURGICAL GLOVESWITH NITRILE COATING: Brand: PROTEXIS

## (undated) DEVICE — 35 ML SYRINGE REGULAR TIP: Brand: MONOJECT

## (undated) DEVICE — MEDI-VAC NON-CONDUCTIVE SUCTION TUBING 6MM X 1.8M (6FT.) L: Brand: CARDINAL HEALTH

## (undated) DEVICE — CAPSULE ENDO PILL CAM SB3

## (undated) DEVICE — 6 ML SYRINGE LUER-LOCK TIP: Brand: MONOJECT

## (undated) DEVICE — LINE MNTR ADLT SET O2 INTMD

## (undated) DEVICE — Device: Brand: DEFENDO AIR/WATER/SUCTION AND BIOPSY VALVE

## (undated) DEVICE — 3 ML SYRINGE LUER-LOCK TIP: Brand: MONOJECT

## (undated) DEVICE — FORCEP RADIAL JAW 4

## (undated) DEVICE — CONMED SCOPE SAVER BITE BLOCK, 20X27 MM: Brand: SCOPE SAVER

## (undated) NOTE — LETTER
7/7/2025          Arie David    [address] [facility] [address]         Dear Arie,    Our records indicate that the tests ordered for you by ELIJAH Cuellar  have not been done.  If you have, in fact, already completed the tests or you do not wish to have the tests done, please contact our office at THE NUMBER LISTED BELOW.  Otherwise, please proceed with the testing.  Enclosed is a duplicate order for your convenience.    Imaging Order:    US LIVER WITH ELASTOGRAPHY(CPT=76705,08153) (Order #[id number]) on 6/4/25     To Schedule a test at [facility]  Please call Central Scheduling At [phone] Monday through Friday between 7:30 am to 6:00 pm and on Saturday 8:00 am to 1:00 pm          Sincerely,    ELIJAH Cuellar  [facility]  [address] [id number] [phone]

## (undated) NOTE — LETTER
August 4, 2021    Mauro Perez, [address] [address]     Patient: Marvin Brown   YOB: 1970   Date of Visit: 8/4/2021       Dear Dr. Sienna Farias MD:    Thank you for referring Marvin Brown to me for evaluation.  Here is my total) by mouth 2 (two) times a day. 60 tablet 0   • Ferrous Sulfate 324 (65 Fe) MG Oral Tab EC Take 1 tablet by mouth daily. 30 tablet 0   • sulfaSALAzine 500 MG Oral Tab Take 3 tablets (1,500 mg total) by mouth 2 (two) times daily.  180 tablet 1   • methy mostly include swelling of the left upper eyelid and itching. Currently, today, patient is asymptomatic.   Told patient that he should call and tell the phone room that he needs to speak to a nurse if symptoms reoccur, so he can be seen in the active pha

## (undated) NOTE — LETTER
07/12/21        Lala Alpers  [address] [address]      Dear Ed Lindo,    [address] records indicate that you have outstanding lab work and or testing that was ordered for you and has not yet been completed:     ACTIN ([address]) ANTIBODY  ALPH

## (undated) NOTE — LETTER
08/27/21        Zaira Winn  [facility] [facility] [address]      Dear Samantha Clancy,    [facility] records indicate that you have outstanding lab work and or testing that was ordered for you and has not yet been completed:     CBC WITH DIFFERENTIAL WITH PLATELET